# Patient Record
Sex: FEMALE | Race: BLACK OR AFRICAN AMERICAN | NOT HISPANIC OR LATINO | Employment: UNEMPLOYED | URBAN - METROPOLITAN AREA
[De-identification: names, ages, dates, MRNs, and addresses within clinical notes are randomized per-mention and may not be internally consistent; named-entity substitution may affect disease eponyms.]

---

## 2017-09-14 ENCOUNTER — OFFICE VISIT (OUTPATIENT)
Dept: FAMILY MEDICINE | Facility: CLINIC | Age: 42
End: 2017-09-14

## 2017-09-14 VITALS
BODY MASS INDEX: 25.32 KG/M2 | RESPIRATION RATE: 20 BRPM | HEIGHT: 62 IN | TEMPERATURE: 97.9 F | DIASTOLIC BLOOD PRESSURE: 78 MMHG | HEART RATE: 64 BPM | OXYGEN SATURATION: 99 % | WEIGHT: 137.6 LBS | SYSTOLIC BLOOD PRESSURE: 119 MMHG

## 2017-09-14 DIAGNOSIS — Z30.42 DEPO-PROVERA CONTRACEPTIVE STATUS: Primary | ICD-10-CM

## 2017-09-14 DIAGNOSIS — Z23 NEEDS FLU SHOT: ICD-10-CM

## 2017-09-14 LAB — HCG UR QL: NEGATIVE

## 2017-09-14 RX ORDER — MEDROXYPROGESTERONE ACETATE 150 MG/ML
150 INJECTION, SUSPENSION INTRAMUSCULAR
Qty: 1 ML | Status: CANCELLED | OUTPATIENT
Start: 2017-09-14 | End: 2017-09-15

## 2017-09-14 NOTE — LETTER
September 14, 2017      Palmira Veloz  [facility] DELIVERY  [place] [address]        Dear Palmira,    Your pregnancy test was negative as expected.  When the repeat is negative in 2 weeks, we will give you your depo shot that will be repeated every 3 months.  Thanks for coming to clinic.    WR    Please see below for your test results.    Resulted Orders   HCG Qualitative Urine (UPT)  ([facility])   Result Value Ref Range    HCG Qual Urine NEGATIVE Negative       If you have any questions, please call the clinic to make an appointment.    Sincerely,    Matthew Harper MD

## 2017-09-14 NOTE — PROGRESS NOTES
Palmira,  Your pregnancy test was negative as expected.  When the repeat is negative in 2 weeks, we will give you your depo shot that will be repeated every 3 months.  Thanks for coming to clinic.  WR

## 2017-09-14 NOTE — NURSING NOTE
Palmira Veloz      1.  Has the patient received the information for the influenza vaccine? YES    2.  Does the patient have any of the following contraindications?     Allergy to eggs? No     Allergic reaction to previous influenza vaccines? No     Any other problems to previous influenza vaccines? No     Paralyzed by Guillain-[place] syndrome? No     Currently pregnant? NO     Current moderate or severe illness? No    Vaccination given by Juan Mckeon, CMA  Recorded by Juan Mckeon

## 2017-09-14 NOTE — MR AVS SNAPSHOT
After Visit Summary   9/14/2017    Palmira Veloz    MRN: [id number]           Patient Information     Date Of Birth          1975        Visit Information        Provider Department      9/14/2017 11:00 AM Matthew Harper MD [facility]        Today's Diagnoses     Depo-Provera contraceptive status    -  1    Needs flu shot          Care Instructions    Come back in 2 weeks for another UPT and if that comes back negative we will administer the Depo for you today        Your medication list is printed, please keep this with you, it is helpful to bring this current list to any other medical appointments, the emergency room or hospital.    If you had lab testing today and your results are reassuring or normal they will be be mailed to you within 7 days.     If the lab tests need quick action we will call you with the results.   The phone number we will call with results is # [phone] (home) . If this is not the best number please call our clinic and change the number.    If you need any refills please call your pharmacy and they will contact us.    If you have any further concerns or wish to schedule another appointment you must call our office during normal business hours  [phone] (8-5:00 M-F)  If you have urgent medical questions that cannot wait  you may also call [phone] at any time of day.  If you have a medical emergency please call [phone].    Thank you for coming to [facility].            Follow-ups after your visit        Your next 10 appointments already scheduled     Sep 21, 2017  9:20 AM CDT   Return Visit with Matthew Harper MD   [facility] ([facility])    [address] [place] [id number]   [phone]              Future tests that were ordered for you today     Open Future Orders        Priority Expected Expires Ordered    HCG Qualitative Urine (UPT)  ([facility]) Routine  9/14/2018 9/14/2017            Who to contact      "Please call your clinic at [phone] to:    Ask questions about your health    Make or cancel appointments    Discuss your medicines    Learn about your test results    Speak to your doctor   If you have compliments or concerns about an experience at your clinic, or if you wish to file a complaint, please contact [facility] Patient Relations at [phone] or email us at [email or web address]         Additional Information About Your Visit        Care EveryWhere ID     This is your Care EveryWhere ID. This could be used by other organizations to access your [place] medical records  [id number]        Your Vitals Were     Pulse Temperature Respirations Height Last Period Pulse Oximetry    64 97.9  F (36.6  C) (Oral) 20 5' 1.5\" (156.2 cm) 08/28/2017 99%    BMI (Body Mass Index)                   25.58 kg/m2            Blood Pressure from Last 3 Encounters:   09/14/17 119/78   10/19/16 103/71   09/06/16 126/81    Weight from Last 3 Encounters:   09/14/17 137 lb 9.6 oz (62.4 kg)   10/19/16 144 lb (65.3 kg)   09/06/16 148 lb (67.1 kg)              We Performed the Following     ADMIN VACCINE, INITIAL     Flu vaccine, quad, preserve-free, 0.5 ml     HCG Qualitative Urine (UPT)  ([facility])        Primary Care Provider Office Phone # Fax #    Nichole Sammi Perez [phone]       [facility] [address] [place] [address]        Equal Access to Services     [facility] CampusSARI AH: Hadii aad ku hadasho Soomaali, waaxda luqadaha, qaybta kaalmada adeegyada, waxay carlos najera . So [facility] [phone].    ATENCIÓN: Si habla español, tiene a zamudio disposición servicios gratuitos de asistencia lingüística. Llame al [phone].    We comply with applicable federal civil rights laws and [place] laws. We do not discriminate on the basis of race, color, national origin, age, disability sex, sexual orientation or gender identity.            Thank " you!     Thank you for choosing [facility]  for your care. Our goal is always to provide you with excellent care. Hearing back from our patients is one way we can continue to improve our services. Please take a few minutes to complete the written survey that you may receive in the mail after your visit with us. Thank you!             Your Updated Medication List - Protect others around you: Learn how to safely use, store and throw away your medicines at www.disposemymeds.org.          This list is accurate as of: 9/14/17 11:59 PM.  Always use your most recent med list.                   Brand Name Dispense Instructions for use Diagnosis    * escitalopram 20 MG tablet    LEXAPRO    90 tablet    Take 1 tablet (20 mg) by mouth daily    Other depression, Encounter for surveillance of injectable contraceptive       * escitalopram 20 MG tablet    LEXAPRO    30 tablet    Take 1 tablet (20 mg) by mouth daily    Depression with anxiety, Insomnia, unspecified type       medroxyPROGESTERone 150 MG/ML injection    DEPO-PROVERA    1 mL    Inject 1 mL (150 mg) into the muscle every 3 months for 20 days    Encounter for surveillance of injectable contraceptive       order for DME     1 Device    Equipment being ordered: silicone sleeve for right 5th toe (bone spur)    Pain of toe of right foot       TOPAMAX PO      Take 10 mg by mouth daily    Encounter for surveillance of injectable contraceptive, Other depression       zolpidem 5 MG tablet    AMBIEN    14 tablet    Take 1 tablet (5 mg) by mouth nightly as needed for sleep May repeat x 1 if needed.    Insomnia, unspecified type       * Notice:  This list has 2 medication(s) that are the same as other medications prescribed for you. Read the directions carefully, and ask your doctor or other care provider to review them with you.

## 2017-09-14 NOTE — PATIENT INSTRUCTIONS
Come back in 2 weeks for another UPT and if that comes back negative we will administer the Depo for you today        Your medication list is printed, please keep this with you, it is helpful to bring this current list to any other medical appointments, the emergency room or hospital.    If you had lab testing today and your results are reassuring or normal they will be be mailed to you within 7 days.     If the lab tests need quick action we will call you with the results.   The phone number we will call with results is # [phone] (home) . If this is not the best number please call our clinic and change the number.    If you need any refills please call your pharmacy and they will contact us.    If you have any further concerns or wish to schedule another appointment you must call our office during normal business hours  [phone] (8-5:00 M-F)  If you have urgent medical questions that cannot wait  you may also call [phone] at any time of day.  If you have a medical emergency please call [phone].    Thank you for coming to [facility].

## 2017-09-14 NOTE — PROGRESS NOTES
"Chief Complaint   Patient presents with     Contraception     Depo     /78 (BP Location: Right arm, Patient Position: Chair, Cuff Size: Adult Regular)  Pulse 64  Temp 97.9  F (36.6  C) (Oral)  Resp 20  Ht 5' 1.5\" (156.2 cm)  Wt 137 lb 9.6 oz (62.4 kg)  LMP 08/28/2017  SpO2 99%  BMI 25.58 kg/m2    SUBJECTIVE:  This is a 42-year-old woman in to start Depo.  She was on it until about a year ago she went off.  Her periods have been regular since she would like to go back on.  She does not want pregnancy.  She is not currently sexually active and does not have any immediate prospects but does not want to get pregnant in the future.  She has 2 children, ages 17 and 24.  She does not have any grandchildren.   OBJECTIVE:   APPEARANCE:  No acute illness.   ASSESSMENT:     1.  Contraception with Depo-Provera.   PLAN:  We discussed the protocol of tapering negative pregnancy test 2 weeks apart.  We will schedule her for lab visit in 2 weeks if the pregnancy test is negative she can get the Depo-Provera without see me.   She should then get the Depo-Provera every 3 months.  She understands the medication from last time and side effects and benefits of the medication.  She understands that maybe some spotting for a few months.   The patient involved in medical decision making throughout the visit.   Recheck in 2 weeks for pregnancy test and Depo Provera If pregnancy test is negative.       "

## 2017-09-28 ENCOUNTER — ALLIED HEALTH/NURSE VISIT (OUTPATIENT)
Dept: FAMILY MEDICINE | Facility: CLINIC | Age: 42
End: 2017-09-28

## 2017-09-28 VITALS
TEMPERATURE: 98.1 F | BODY MASS INDEX: 26.29 KG/M2 | WEIGHT: 139.25 LBS | HEART RATE: 81 BPM | HEIGHT: 61 IN | DIASTOLIC BLOOD PRESSURE: 77 MMHG | SYSTOLIC BLOOD PRESSURE: 113 MMHG | OXYGEN SATURATION: 100 %

## 2017-09-28 DIAGNOSIS — Z30.42 ENCOUNTER FOR SURVEILLANCE OF INJECTABLE CONTRACEPTIVE: ICD-10-CM

## 2017-09-28 DIAGNOSIS — Z30.42 DEPO-PROVERA CONTRACEPTIVE STATUS: ICD-10-CM

## 2017-09-28 LAB — HCG UR QL: NEGATIVE

## 2017-09-28 NOTE — NURSING NOTE
The following medication was given:   Depo give to patient with reminder card give to patient.  Next Depo due between 12/15/17 to 01/04/2018      MEDICATION: Medroxyprogesterone (DEPO)  ROUTE: {ROUTE IM  SITE: LT gluteus   DOSE: 150 mg/1 ml  LOT #:[id number]  :  Cem  EXPIRATION DATE:  01/01/2020  NDC#: [address]-0  Depo give by Michelle Escalona MA  Second UPT was negative today and per DR Harper notes  in 09/14/17 if  second UPT negative okay to give Depo to patient.  Medication was verify with Malu Kapoor CMA, Dr. was original ordered depo shot. FRANCOIS Paulino Dr is a preceptor on site during the time of service.

## 2017-09-28 NOTE — LETTER
September 29, 2017      Palmira Veloz  [facility] DELIVERY  [place] [address]        Dear Palmira,    As you know your test was negative and you got your shot.  Please return for the next shot in 3 months.    WR    Please see below for your test results.    Resulted Orders   HCG Qualitative Urine (UPT)  (P )   Result Value Ref Range    HCG Qual Urine NEGATIVE Negative       If you have any questions, please call the clinic to make an appointment.    Sincerely,    Matthew Harper MD

## 2017-09-28 NOTE — MR AVS SNAPSHOT
"              After Visit Summary   9/28/2017    Palmira Veloz    MRN: [id number]           Patient Information     Date Of Birth          1975        Visit Information        Provider Department      9/28/2017 10:00 AM Nurse, Ann Ump [facility]        Today's Diagnoses     Depo-Provera contraceptive status           Follow-ups after your visit        Who to contact     Please call your clinic at [phone] to:    Ask questions about your health    Make or cancel appointments    Discuss your medicines    Learn about your test results    Speak to your doctor   If you have compliments or concerns about an experience at your clinic, or if you wish to file a complaint, please contact [facility] Patient Relations at [phone] or email us at [email or web address]         Additional Information About Your Visit        Care EveryWhere ID     This is your Care EveryWhere ID. This could be used by other organizations to access your [place] medical records  [id number]        Your Vitals Were     Pulse Temperature Height Last Period Pulse Oximetry BMI (Body Mass Index)    81 98.1  F (36.7  C) (Oral) 5' 1\" (154.9 cm) 09/27/2017 100% 26.31 kg/m2       Blood Pressure from Last 3 Encounters:   09/28/17 113/77   09/14/17 119/78   10/19/16 103/71    Weight from Last 3 Encounters:   09/28/17 139 lb 4 oz (63.2 kg)   09/14/17 137 lb 9.6 oz (62.4 kg)   10/19/16 144 lb (65.3 kg)              We Performed the Following     HCG Qualitative Urine (UPT)  ([facility])     INJECTION INTRAMUSCULAR OR SUB-Q     medroxyPROGESTERone (DEPO-PROVERA) injection 150 mg (Charge)        Primary Care Provider Office Phone # Fax #    Nichole Perez [phone]       [facility] [address] [place] [address]        Equal Access to Services     HARJIT CERDA AH: Hadii aad ku hadasho Soomaali, waaxda luqadaha, qaybta kaalmada adeegyada, juli polo " mahendramanueltaurus greeneAshleyparish ah. So [facility] [phone].    ATENCIÓN: Si bharti davies, tiene a zamudio disposición servicios gratuitos de asistencia lingüística. Es oneal [phone].    We comply with applicable federal civil rights laws and [place] laws. We do not discriminate on the basis of race, color, national origin, age, disability sex, sexual orientation or gender identity.            Thank you!     Thank you for choosing [facility]  for your care. Our goal is always to provide you with excellent care. Hearing back from our patients is one way we can continue to improve our services. Please take a few minutes to complete the written survey that you may receive in the mail after your visit with us. Thank you!             Your Updated Medication List - Protect others around you: Learn how to safely use, store and throw away your medicines at www.disposemymeds.org.          This list is accurate as of: 9/28/17 10:55 AM.  Always use your most recent med list.                   Brand Name Dispense Instructions for use Diagnosis    * escitalopram 20 MG tablet    LEXAPRO    90 tablet    Take 1 tablet (20 mg) by mouth daily    Other depression, Encounter for surveillance of injectable contraceptive       * escitalopram 20 MG tablet    LEXAPRO    30 tablet    Take 1 tablet (20 mg) by mouth daily    Depression with anxiety, Insomnia, unspecified type       medroxyPROGESTERone 150 MG/ML injection    DEPO-PROVERA    1 mL    Inject 1 mL (150 mg) into the muscle every 3 months for 20 days    Encounter for surveillance of injectable contraceptive       order for DME     1 Device    Equipment being ordered: silicone sleeve for right 5th toe (bone spur)    Pain of toe of right foot       TOPAMAX PO      Take 10 mg by mouth daily    Encounter for surveillance of injectable contraceptive, Other depression       zolpidem 5 MG tablet    AMBIEN    14 tablet    Take 1 tablet (5 mg) by mouth nightly as needed for sleep May repeat x 1 if  needed.    Insomnia, unspecified type       * Notice:  This list has 2 medication(s) that are the same as other medications prescribed for you. Read the directions carefully, and ask your doctor or other care provider to review them with you.

## 2017-09-29 NOTE — PROGRESS NOTES
Palmira,  As you know your test was negative and you got your shot.  Please return for the next shot in 3 months.  WR

## 2017-10-01 RX ORDER — MEDROXYPROGESTERONE ACETATE 150 MG/ML
150 INJECTION, SUSPENSION INTRAMUSCULAR
Qty: 1 ML | Refills: 0 | OUTPATIENT
Start: 2017-12-15 | End: 2017-12-22

## 2017-12-22 ENCOUNTER — OFFICE VISIT (OUTPATIENT)
Dept: FAMILY MEDICINE | Facility: CLINIC | Age: 42
End: 2017-12-22
Payer: COMMERCIAL

## 2017-12-22 VITALS
HEIGHT: 62 IN | SYSTOLIC BLOOD PRESSURE: 121 MMHG | HEART RATE: 76 BPM | DIASTOLIC BLOOD PRESSURE: 81 MMHG | TEMPERATURE: 97.9 F | BODY MASS INDEX: 26.87 KG/M2 | OXYGEN SATURATION: 100 % | WEIGHT: 146 LBS

## 2017-12-22 DIAGNOSIS — Z30.42 ENCOUNTER FOR SURVEILLANCE OF INJECTABLE CONTRACEPTIVE: ICD-10-CM

## 2017-12-22 DIAGNOSIS — Z30.42 SURVEILLANCE OF CONTRACEPTIVE INJECTION: ICD-10-CM

## 2017-12-22 DIAGNOSIS — J06.9 UPPER RESPIRATORY TRACT INFECTION, UNSPECIFIED TYPE: Primary | ICD-10-CM

## 2017-12-22 RX ORDER — ACETAMINOPHEN 325 MG/1
650 TABLET ORAL EVERY 4 HOURS PRN
Qty: 100 TABLET | Refills: 0 | Status: SHIPPED | OUTPATIENT
Start: 2017-12-22 | End: 2018-12-13

## 2017-12-22 RX ORDER — BENZONATATE 100 MG/1
100 CAPSULE ORAL 3 TIMES DAILY PRN
Qty: 42 CAPSULE | Refills: 0 | Status: SHIPPED | OUTPATIENT
Start: 2017-12-22 | End: 2018-12-13

## 2017-12-22 NOTE — NURSING NOTE
BP: 121/81    LAST PAP/EXAM: No results found for: PAP  URINE HCG:not indicated    The following medication was administered to Palmira Veloz by Jo Rutledge CMA:     MEDICATION: Depo Provera 150mg  ROUTE: IM  SITE: LUQ - Gluteus  : [employer]  LOT #: [id number]  EXP:04/2019  NEXT INJECTION DUE: 3/9/18 - 3/30/18   Provider: Santana onside and ordering provider is Dr Perez for this medication

## 2017-12-22 NOTE — PROGRESS NOTES
HPI:       Palmira Veloz is a 42 year old  female with a significant past medical history of GERD, conversion disorder, acute reaction to stress and depression who presents for the new concern(s) of    Persistent Flu-Like Symptoms  Approximately 1 week ago, she developed cough with clear sputum production, rhinorrhea, headaches and diffuse myalgia.  Nothing seems to trigger her symptoms but she did noted recent sick contacts with multiple members of her family with similar symptoms.  She also endorsed episodes of diarrhea, nausea and vomiting which has improved since onset.  Her family members have solely done better she feels that her symptoms have been unchanged since it started.  Has been taking [employer] pain reliever but does not know the details of medication.  Denies any relief with medication.  Denies any lightheadedness, focal weakness, abdominal pain, numbness, difficulty with breathing, fevers or chills.  Did noted some midsternal chest pain when coughing but none at baseline. Coughing does get worse at night but has not affect her sleep.    Depo-Shot  Would like repeat depo-shot, last shot was about 3 months ago. No concerns for pregnancy. Negative UPT at last visit.         PMHX:     Patient Active Problem List   Diagnosis     Health Care Home     Conversion disorder     Contact dermatitis and other eczema, due to unspecified cause     Esophageal reflux     Insomnia     Other and unspecified derangement of medial meniscus     Pain in joint, shoulder region     Major depressive disorder, recurrent episode (H)     Tobacco use disorder     Knee cap dislocation     Acute reaction to stress     Depo-Provera contraceptive status     Current Outpatient Prescriptions   Medication Sig Dispense Refill     benzonatate (TESSALON) 100 MG capsule Take 1 capsule (100 mg) by mouth 3 times daily as needed for cough 42 capsule 0     acetaminophen (TYLENOL) 325 MG tablet Take 2 tablets (650 mg) by mouth every 4  hours as needed for mild pain 100 tablet 0     medroxyPROGESTERone (DEPO-PROVERA) 150 MG/ML injection Inject 1 mL (150 mg) into the muscle every 3 months for 21 days 1 mL 0     zolpidem (AMBIEN) 5 MG tablet Take 1 tablet (5 mg) by mouth nightly as needed for sleep May repeat x 1 if needed. 14 tablet 0     escitalopram (LEXAPRO) 20 MG tablet Take 1 tablet (20 mg) by mouth daily 30 tablet 1     order for DME Equipment being ordered: silicone sleeve for right 5th toe (bone spur) 1 Device 0     Topiramate (TOPAMAX PO) Take 10 mg by mouth daily       escitalopram (LEXAPRO) 20 MG tablet Take 1 tablet (20 mg) by mouth daily 90 tablet 3     Social History     Social History     Marital status: Single     Spouse name: N/A     Number of children: N/A     Years of education: N/A     Occupational History     Not on file.     Social History Main Topics     Smoking status: Current Every Day Smoker     Types: Cigarettes     Smokeless tobacco: Never Used      Comment: Pt. is trying to quit smoking.  Day #2     Alcohol use 0.0 oz/week     0 Standard drinks or equivalent per week      Comment: Occasionally.      Drug use: Yes      Comment: Marijuana Daily      Sexual activity: Not on file     Other Topics Concern     Not on file     Social History Narrative          Allergies   Allergen Reactions     Nkda [No Known Drug Allergies]        No results found for this or any previous visit (from the past 24 hour(s)).         Review of Systems:   C: NEGATIVE for fatigue, unexpected change in weight  E: NEGATIVE for acute vision problems or changes  ENT/MOUTH: Rhinorrhea, See HPI  RESP: coughing: see HPI  CV: NEGATIVE for palpitations or new or worsening peripheral edema  CV: chest pain: see HPI  GI: nausea, vomiting, diarrhea which has improved/resolved  : NEGATIVE for frequency, dysuria, or hematuria  MUSCULOSKELETAL:diffuse body aches, no joint pain: see HPI  NEURO: slight headaches: see HPI  P: NEGATIVE for changes in mood or affect  "         Physical Exam:     Vitals:    12/22/17 1135   BP: 121/81   Pulse: 76   Temp: 97.9  F (36.6  C)   SpO2: 100%   Weight: 146 lb (66.2 kg)   Height: 5' 1.5\" (156.2 cm)     Body mass index is 27.14 kg/(m^2).    GENERAL APPEARANCE: healthy, alert and no distress,  EYES: Eyes grossly normal to inspection,  PERRL  HENT: ear canals and TM's normal and nose and mouth without ulcers or lesions, thick mucus in posterior pharynx  RESP: lungs clear to auscultation - no rales, rhonchi or wheezes  CV: regular rate and rhythm,  and no murmur, click,  rub or gallop  ABDOMEN: soft, nontender, without hepatosplenomegaly or masses  MS: extremities normal- no gross deformities noted  PSYCH: mood and affect normal/bright    Assessment and Plan     1. Upper respiratory tract infection, unspecified type  Coughing, rhinorrhea and recent sick contact.  Unlikely pneumonia given clear lung exam.  Discuss natural course of illness with patient and expectation for improving symptoms over the next week or so.  Coughing seems significant and patient is requesting medication.  Will trial Tessalon Perles and conservative therapy.  -Conservative therapy: Staying well-hydrated, Tylenol as needed for discomfort  -Discontinue over-the-counter pain medication given unclear details  - Discussed warning signs for immediate reevaluation including persistent fevers,-difficulty with breathing or episodes of presyncope/syncope.  - benzonatate (TESSALON) 100 MG capsule; Take 1 capsule (100 mg) by mouth 3 times daily as needed for cough  Dispense: 42 capsule; Refill: 0  - acetaminophen (TYLENOL) 325 MG tablet; Take 2 tablets (650 mg) by mouth every 4 hours as needed for mild pain  Dispense: 100 tablet; Refill: 0    2. Surveillance of contraceptive injection  - INJECTION INTRAMUSCULAR OR SUB-Q  - medroxyPROGESTERone (DEPO-PROVERA) injection 150 mg (Charge)    Options for treatment and follow-up care were reviewed with the patient and/or guardian. Palmira" MARLIN Veloz and/or guardian engaged in the decision making process and verbalized understanding of the options discussed and agreed with the final plan.    Stephon Doll MD  [facility]  Pager: [phone]    Precepted today with: Jennifer Ramirez MD

## 2017-12-22 NOTE — LETTER
RETURN TO WORK/SCHOOL FORM    12/22/2017    Re: Palmira Veloz  1975      To Whom It May Concern:    Palmira Veloz was seen in clinic today. Please excused her from school for the days she has miss. If you have any concerns, please feel free to give us a call.      Stephon Doll MD  12/22/2017 11:59 AM

## 2017-12-22 NOTE — MR AVS SNAPSHOT
"              After Visit Summary   2017    Palmira Veloz    MRN: [id number]           Patient Information     Date Of Birth          1975        Visit Information        Provider Department      2017 11:20 AM Stephon Doll MD [facility]        Today's Diagnoses     Upper respiratory tract infection, unspecified type    -  1       Follow-ups after your visit        Who to contact     Please call your clinic at [phone] to:    Ask questions about your health    Make or cancel appointments    Discuss your medicines    Learn about your test results    Speak to your doctor   If you have compliments or concerns about an experience at your clinic, or if you wish to file a complaint, please contact [facility] Patient Relations at [phone] or email us at [email or web address]         Additional Information About Your Visit        MyChart Information     [employer] is an electronic gateway that provides easy, online access to your medical records. With FanHero, you can request a clinic appointment, read your test results, renew a prescription or communicate with your care team.     To sign up for [employer] visit the website at [email or web address]   You will be asked to enter the access code listed below, as well as some personal information. Please follow the directions to create your username and password.     Your access code is: [email or web address]  Expires: 3/22/2018 12:00 PM     Your access code will  in 90 days. If you need help or a new code, please contact your [facility] or call [phone] for assistance.        Care EveryWhere ID     This is your Care EveryWhere ID. This could be used by other organizations to access your [place] medical records  [id number]        Your Vitals Were     Pulse Temperature Height Pulse Oximetry BMI (Body Mass Index)       76 97.9  F (36.6  C) 5' 1.5\" (156.2 cm) 100% " 27.14 kg/m2        Blood Pressure from Last 3 Encounters:   12/22/17 121/81   09/28/17 113/77   09/14/17 119/78    Weight from Last 3 Encounters:   12/22/17 146 lb (66.2 kg)   09/28/17 139 lb 4 oz (63.2 kg)   09/14/17 137 lb 9.6 oz (62.4 kg)              Today, you had the following     No orders found for display         Today's Medication Changes          These changes are accurate as of: 12/22/17 12:02 PM.  If you have any questions, ask your nurse or doctor.               Start taking these medicines.        Dose/Directions    acetaminophen 325 MG tablet   Commonly known as:  TYLENOL   Used for:  Upper respiratory tract infection, unspecified type   Started by:  Stephon Doll MD        Dose:  650 mg   Take 2 tablets (650 mg) by mouth every 4 hours as needed for mild pain   Quantity:  100 tablet   Refills:  0       benzonatate 100 MG capsule   Commonly known as:  TESSALON   Used for:  Upper respiratory tract infection, unspecified type   Started by:  Stephon Doll MD        Dose:  100 mg   Take 1 capsule (100 mg) by mouth 3 times daily as needed for cough   Quantity:  42 capsule   Refills:  0            Where to get your medicines      These medications were sent to [id number]     Phone:  [phone]     acetaminophen 325 MG tablet    benzonatate 100 MG capsule                Primary Care Provider Office Phone # Fax #    Nichole Sammi Perez [phone]       [facility] [address] [place] [address]        Equal Access to Services     [facility] AH: Hadii aad ku hadasho Soomaali, waaxda luqadaha, qaybta kaalmada adeegyada, juli cadet. So [facility] [phone].    ATENCIÓN: Si habla español, tiene a zamudio disposición servicios gratuitos de asistencia lingüística. Llame al [phone].    We comply with applicable federal civil rights laws and [place] laws.  We do not discriminate on the basis of race, color, national origin, age, disability, sex, sexual orientation, or gender identity.            Thank you!     Thank you for choosing [facility]  for your care. Our goal is always to provide you with excellent care. Hearing back from our patients is one way we can continue to improve our services. Please take a few minutes to complete the written survey that you may receive in the mail after your visit with us. Thank you!             Your Updated Medication List - Protect others around you: Learn how to safely use, store and throw away your medicines at www.disposemymeds.org.          This list is accurate as of: 12/22/17 12:02 PM.  Always use your most recent med list.                   Brand Name Dispense Instructions for use Diagnosis    acetaminophen 325 MG tablet    TYLENOL    100 tablet    Take 2 tablets (650 mg) by mouth every 4 hours as needed for mild pain    Upper respiratory tract infection, unspecified type       benzonatate 100 MG capsule    TESSALON    42 capsule    Take 1 capsule (100 mg) by mouth 3 times daily as needed for cough    Upper respiratory tract infection, unspecified type       * escitalopram 20 MG tablet    LEXAPRO    90 tablet    Take 1 tablet (20 mg) by mouth daily    Other depression, Encounter for surveillance of injectable contraceptive       * escitalopram 20 MG tablet    LEXAPRO    30 tablet    Take 1 tablet (20 mg) by mouth daily    Depression with anxiety, Insomnia, unspecified type       medroxyPROGESTERone 150 MG/ML injection    DEPO-PROVERA    1 mL    Inject 1 mL (150 mg) into the muscle every 3 months for 21 days    Encounter for surveillance of injectable contraceptive       order for DME     1 Device    Equipment being ordered: silicone sleeve for right 5th toe (bone spur)    Pain of toe of right foot       TOPAMAX PO      Take 10 mg by mouth daily    Encounter for surveillance of injectable contraceptive, Other  depression       zolpidem 5 MG tablet    AMBIEN    14 tablet    Take 1 tablet (5 mg) by mouth nightly as needed for sleep May repeat x 1 if needed.    Insomnia, unspecified type       * Notice:  This list has 2 medication(s) that are the same as other medications prescribed for you. Read the directions carefully, and ask your doctor or other care provider to review them with you.

## 2017-12-23 RX ORDER — MEDROXYPROGESTERONE ACETATE 150 MG/ML
150 INJECTION, SUSPENSION INTRAMUSCULAR
Qty: 1 ML | Refills: 0 | OUTPATIENT
Start: 2018-03-09 | End: 2018-03-13

## 2017-12-26 ASSESSMENT — PATIENT HEALTH QUESTIONNAIRE - PHQ9: SUM OF ALL RESPONSES TO PHQ QUESTIONS 1-9: 9

## 2017-12-29 NOTE — PROGRESS NOTES
Preceptor Attestation:  Patient's case reviewed and discussed with Stephon Doll MD Patient seen and discussed with the resident.. I agree with assessment and plan of care.  Supervising Physician:  Jennifer Ramirez MD  [facility]

## 2018-03-13 ENCOUNTER — ALLIED HEALTH/NURSE VISIT (OUTPATIENT)
Dept: FAMILY MEDICINE | Facility: CLINIC | Age: 43
End: 2018-03-13
Payer: COMMERCIAL

## 2018-03-13 VITALS
TEMPERATURE: 98.2 F | HEART RATE: 66 BPM | OXYGEN SATURATION: 99 % | RESPIRATION RATE: 20 BRPM | BODY MASS INDEX: 27.64 KG/M2 | SYSTOLIC BLOOD PRESSURE: 114 MMHG | WEIGHT: 146.4 LBS | DIASTOLIC BLOOD PRESSURE: 79 MMHG | HEIGHT: 61 IN

## 2018-03-13 DIAGNOSIS — Z30.42 ENCOUNTER FOR SURVEILLANCE OF INJECTABLE CONTRACEPTIVE: ICD-10-CM

## 2018-03-13 DIAGNOSIS — Z30.42 DEPO-PROVERA CONTRACEPTIVE STATUS: Primary | ICD-10-CM

## 2018-03-13 RX ORDER — MEDROXYPROGESTERONE ACETATE 150 MG/ML
150 INJECTION, SUSPENSION INTRAMUSCULAR
Qty: 1 ML | Refills: 0 | OUTPATIENT
Start: 2018-05-29 | End: 2018-05-30

## 2018-03-13 NOTE — MR AVS SNAPSHOT
"              After Visit Summary   3/13/2018    Palmira Veloz    MRN: [id number]           Patient Information     Date Of Birth          1975        Visit Information        Provider Department      3/13/2018 10:00 AM Nurse, Ann Ump [facility]        Today's Diagnoses     Depo-Provera contraceptive status    -  1       Follow-ups after your visit        Your next 10 appointments already scheduled     Mar 16, 2018  1:40 PM CDT   Return Visit with Lauren Hayward DO   [facility] ([facility])    [address] [place] [address]   [phone]              Who to contact     Please call your clinic at [phone] to:    Ask questions about your health    Make or cancel appointments    Discuss your medicines    Learn about your test results    Speak to your doctor            Additional Information About Your Visit        MyChart Information     [employer] is an electronic gateway that provides easy, online access to your medical records. With [employer], you can request a clinic appointment, read your test results, renew a prescription or communicate with your care team.     To sign up for Vo[employer] visit the website at [email or web address]   You will be asked to enter the access code listed below, as well as some personal information. Please follow the directions to create your username and password.     Your access code is: [email or web address]  Expires: 3/22/2018  1:00 PM     Your access code will  in 90 days. If you need help or a new code, please contact your [facility] or call [phone] for assistance.        Care EveryWhere ID     This is your Care EveryWhere ID. This could be used by other organizations to access your [place] medical records  [id number]        Your Vitals Were     Pulse Temperature Respirations Height Pulse Oximetry BMI (Body Mass Index)    66 98.2  F (36.8  C) (Oral) 20 5' 1.25\" (155.6 cm) 99% 27.44 kg/m2       Blood " Pressure from Last 3 Encounters:   03/13/18 114/79   12/22/17 121/81   09/28/17 113/77    Weight from Last 3 Encounters:   03/13/18 146 lb 6.4 oz (66.4 kg)   12/22/17 146 lb (66.2 kg)   09/28/17 139 lb 4 oz (63.2 kg)              We Performed the Following     INJECTION INTRAMUSCULAR OR SUB-Q     medroxyPROGESTERone (DEPO-PROVERA) injection 150 mg (Charge)        Primary Care Provider Office Phone # Fax #    Nichole Sammi Perez [phone]       [facility] [address] [place] [address]        Equal Access to Services     HARJIT CERDA : Linda Looney, wasilvana burton, qaybta kaalmada myra, juli cadet. So [facility] [phone].    ATENCIÓN: Si habla español, tiene a zamudio disposición servicios gratuitos de asistencia lingüística. Llame al [phone].    We comply with applicable federal civil rights laws and [place] laws. We do not discriminate on the basis of race, color, national origin, age, disability, sex, sexual orientation, or gender identity.            Thank you!     Thank you for choosing [facility]  for your care. Our goal is always to provide you with excellent care. Hearing back from our patients is one way we can continue to improve our services. Please take a few minutes to complete the written survey that you may receive in the mail after your visit with us. Thank you!             Your Updated Medication List - Protect others around you: Learn how to safely use, store and throw away your medicines at www.disposemymeds.org.          This list is accurate as of 3/13/18 10:29 AM.  Always use your most recent med list.                   Brand Name Dispense Instructions for use Diagnosis    acetaminophen 325 MG tablet    TYLENOL    100 tablet    Take 2 tablets (650 mg) by mouth every 4 hours as needed for mild pain    Upper respiratory tract infection, unspecified type       benzonatate 100 MG capsule    TESSALON     42 capsule    Take 1 capsule (100 mg) by mouth 3 times daily as needed for cough    Upper respiratory tract infection, unspecified type       * escitalopram 20 MG tablet    LEXAPRO    90 tablet    Take 1 tablet (20 mg) by mouth daily    Other depression, Encounter for surveillance of injectable contraceptive       * escitalopram 20 MG tablet    LEXAPRO    30 tablet    Take 1 tablet (20 mg) by mouth daily    Depression with anxiety, Insomnia, unspecified type       medroxyPROGESTERone 150 MG/ML injection    DEPO-PROVERA    1 mL    Inject 1 mL (150 mg) into the muscle every 3 months    Encounter for surveillance of injectable contraceptive       order for DME     1 Device    Equipment being ordered: silicone sleeve for right 5th toe (bone spur)    Pain of toe of right foot       TOPAMAX PO      Take 10 mg by mouth daily    Encounter for surveillance of injectable contraceptive, Other depression       zolpidem 5 MG tablet    AMBIEN    14 tablet    Take 1 tablet (5 mg) by mouth nightly as needed for sleep May repeat x 1 if needed.    Insomnia, unspecified type       * Notice:  This list has 2 medication(s) that are the same as other medications prescribed for you. Read the directions carefully, and ask your doctor or other care provider to review them with you.

## 2018-03-13 NOTE — NURSING NOTE
BP: 114/79    LAST PAP/EXAM: No results found for: PAP  URINE HCG:not indicated    The following medication was given:     MEDICATION: Depo Provera 150mg  ROUTE: IM  SITE: Deltoid - Right  : TEVA  LOT #: [id number]  EXP:07/2019  NEXT INJECTION DUE: 5/29/18 - 6/19/18   Provider on site during injection: Dr. Anderson  Ordering Provider: Jayda Anderson  Person who administered medication: Juan Mckeon CMA

## 2018-03-16 ENCOUNTER — TELEPHONE (OUTPATIENT)
Dept: FAMILY MEDICINE | Facility: CLINIC | Age: 43
End: 2018-03-16

## 2018-03-16 ENCOUNTER — OFFICE VISIT (OUTPATIENT)
Dept: FAMILY MEDICINE | Facility: CLINIC | Age: 43
End: 2018-03-16
Payer: COMMERCIAL

## 2018-03-16 VITALS
HEART RATE: 80 BPM | TEMPERATURE: 98.5 F | OXYGEN SATURATION: 100 % | WEIGHT: 147.8 LBS | DIASTOLIC BLOOD PRESSURE: 87 MMHG | HEIGHT: 61 IN | SYSTOLIC BLOOD PRESSURE: 126 MMHG | BODY MASS INDEX: 27.9 KG/M2

## 2018-03-16 DIAGNOSIS — H54.7 DIMINISHED VISION: ICD-10-CM

## 2018-03-16 DIAGNOSIS — S05.92XA LEFT EYE INJURY, INITIAL ENCOUNTER: Primary | ICD-10-CM

## 2018-03-16 NOTE — TELEPHONE ENCOUNTER
had talked to me about some resources for the pt.  The pt and her eighteen years old son is homeless, and there are not any shelter that would let them both stay together. I know that family shelters within the [facility] would considered the pt son as an adult, so they would have to go to different shelters.  I called [facility] [address], they were able to give me a shelter that would take men and women, so the pt and her son can stay not together in the same room, but as least in the same shelter.  This shelter accepts people starting at 5pm sharp.  So the pt and her son will have to go there, early to get a bed[place] [address]  [phone]    I called the pt and gave her this information, and that they should be there before 5pm to get a bed.

## 2018-03-16 NOTE — TELEPHONE ENCOUNTER
Called pt   She was at clinic today and asking Physician for help as she is currently living with her 18yr old out of their truck.    When asking her about this, she did state that she bounces around, usually staying in her vehicle outside of friends homes    Son will stay with other friends at times so he can finish school and stay on track.    She is able to get to friends ect to shower     Offered the one resource we have that allows for mother and an adult child - male to stay together- must call the central number tell them applying for the family place and they would be on a wait list right now looks about 4 weeks out.  Lbetz

## 2018-03-16 NOTE — PATIENT INSTRUCTIONS
Your medication list is printed, please keep this with you, it is helpful to bring this current list to any other medical appointments, the emergency room or hospital.    If you had lab testing today and your results are reassuring or normal they will be be mailed to you within 7 days.     If the lab tests need quick action we will call you with the results.   The phone number we will call with results is # [phone] (home) . If this is not the best number please call our clinic and change the number.    If you need any refills please call your pharmacy and they will contact us.    If you have any further concerns or wish to schedule another appointment you must call our office during normal business hours  [phone] (8-5:00 M-F)  If you have urgent medical questions that cannot wait  you may also call [phone] at any time of day.  If you have a medical emergency please call [phone].    Thank you for coming to [facility].      Referral for ( TEST )  :      Ophthalmology   LOCATION/PLACE/Provider :    [facility]  DATE & TIME :     3- at 10:45am  PHONE :     [phone]  FAX :     [phone]  Appointment made by clinic staff/:    Kaity

## 2018-03-16 NOTE — MR AVS SNAPSHOT
After Visit Summary   3/16/2018    Palmira Veloz    MRN: [id number]           Patient Information     Date Of Birth          1975        Visit Information        Provider Department      3/16/2018 1:40 PM Lauren Hayward DO [facility]        Today's Diagnoses     Left eye injury, initial encounter    -  1      Care Instructions    Your medication list is printed, please keep this with you, it is helpful to bring this current list to any other medical appointments, the emergency room or hospital.    If you had lab testing today and your results are reassuring or normal they will be be mailed to you within 7 days.     If the lab tests need quick action we will call you with the results.   The phone number we will call with results is # [phone] (home) . If this is not the best number please call our clinic and change the number.    If you need any refills please call your pharmacy and they will contact us.    If you have any further concerns or wish to schedule another appointment you must call our office during normal business hours  [phone] (8-5:00 M-F)  If you have urgent medical questions that cannot wait  you may also call [phone] at any time of day.  If you have a medical emergency please call [phone].    Thank you for coming to [facility].            Follow-ups after your visit        Additional Services     OPHTHALMOLOGY ADULT REFERRAL       Your provider has referred you to: [facility]    Please be aware that coverage of these services is subject to the terms and limitations of your health insurance plan.  Call member services at your health plan with any benefit or coverage questions.      Please bring the following with you to your appointment:    (1) Any X-Rays, CTs or MRIs which have been performed.  Contact the facility where they were done to arrange for  prior to your scheduled appointment.    (2) List of current medications  (3) This  "referral request   (4) Any documents/labs given to you for this referral                  Who to contact     Please call your clinic at [phone] to:    Ask questions about your health    Make or cancel appointments    Discuss your medicines    Learn about your test results    Speak to your doctor            Additional Information About Your Visit        MyChart Information     [employer] is an electronic gateway that provides easy, online access to your medical records. With [employer], you can request a clinic appointment, read your test results, renew a prescription or communicate with your care team.     To sign up for [employer] visit the website at [email or web address]   You will be asked to enter the access code listed below, as well as some personal information. Please follow the directions to create your username and password.     Your access code is: [email or web address]  Expires: 3/22/2018  1:00 PM     Your access code will  in 90 days. If you need help or a new code, please contact your [facility] or call [phone] for assistance.        Care EveryWhere ID     This is your Care EveryWhere ID. This could be used by other organizations to access your [place] medical records  [id number]        Your Vitals Were     Pulse Temperature Height Pulse Oximetry BMI (Body Mass Index)       80 98.5  F (36.9  C) (Oral) 5' 1.25\" (155.6 cm) 100% 27.7 kg/m2        Blood Pressure from Last 3 Encounters:   18 126/87   18 114/79   17 121/81    Weight from Last 3 Encounters:   18 147 lb 12.8 oz (67 kg)   18 146 lb 6.4 oz (66.4 kg)   17 146 lb (66.2 kg)              We Performed the Following     OPHTHALMOLOGY ADULT REFERRAL        Primary Care Provider Office Phone # Fax #    Nichoel Perez [phone]       [facility] [address] [place] [address]        Equal Access to Services     HARJIT CERDA AH: Hadii aad ku " alec Looney, pankajda lubenjiadaha, qatamikota kapartha renteria, juli cheloin hayaan davecasimiro mahendraginny lajoannemarlen helio. So [facility] [phone].    ATENCIÓN: Si habla español, tiene a zamudio disposición servicios gratuitos de asistencia lingüística. Es al [phone].    We comply with applicable federal civil rights laws and [place] laws. We do not discriminate on the basis of race, color, national origin, age, disability, sex, sexual orientation, or gender identity.            Thank you!     Thank you for choosing [facility]  for your care. Our goal is always to provide you with excellent care. Hearing back from our patients is one way we can continue to improve our services. Please take a few minutes to complete the written survey that you may receive in the mail after your visit with us. Thank you!             Your Updated Medication List - Protect others around you: Learn how to safely use, store and throw away your medicines at www.disposemymeds.org.          This list is accurate as of 3/16/18  2:01 PM.  Always use your most recent med list.                   Brand Name Dispense Instructions for use Diagnosis    acetaminophen 325 MG tablet    TYLENOL    100 tablet    Take 2 tablets (650 mg) by mouth every 4 hours as needed for mild pain    Upper respiratory tract infection, unspecified type       benzonatate 100 MG capsule    TESSALON    42 capsule    Take 1 capsule (100 mg) by mouth 3 times daily as needed for cough    Upper respiratory tract infection, unspecified type       escitalopram 20 MG tablet    LEXAPRO    30 tablet    Take 1 tablet (20 mg) by mouth daily    Depression with anxiety, Insomnia, unspecified type       medroxyPROGESTERone 150 MG/ML injection   Start taking on:  5/29/2018    DEPO-PROVERA    1 mL    Inject 1 mL (150 mg) into the muscle every 3 months for 21 days    Encounter for surveillance of injectable contraceptive       order for DME     1 Device    Equipment being ordered: silicone  sleeve for right 5th toe (bone spur)    Pain of toe of right foot       TOPAMAX PO      Take 10 mg by mouth daily    Encounter for surveillance of injectable contraceptive, Other depression       zolpidem 5 MG tablet    AMBIEN    14 tablet    Take 1 tablet (5 mg) by mouth nightly as needed for sleep May repeat x 1 if needed.    Insomnia, unspecified type
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
## 2018-04-08 ENCOUNTER — HEALTH MAINTENANCE LETTER (OUTPATIENT)
Age: 43
End: 2018-04-08

## 2018-05-30 ENCOUNTER — ALLIED HEALTH/NURSE VISIT (OUTPATIENT)
Dept: FAMILY MEDICINE | Facility: CLINIC | Age: 43
End: 2018-05-30
Payer: COMMERCIAL

## 2018-05-30 VITALS
OXYGEN SATURATION: 99 % | DIASTOLIC BLOOD PRESSURE: 99 MMHG | TEMPERATURE: 98 F | HEART RATE: 83 BPM | WEIGHT: 142.8 LBS | BODY MASS INDEX: 26.76 KG/M2 | SYSTOLIC BLOOD PRESSURE: 149 MMHG

## 2018-05-30 DIAGNOSIS — Z30.42 DEPO-PROVERA CONTRACEPTIVE STATUS: Primary | ICD-10-CM

## 2018-05-30 DIAGNOSIS — Z30.42 ENCOUNTER FOR SURVEILLANCE OF INJECTABLE CONTRACEPTIVE: ICD-10-CM

## 2018-05-30 RX ORDER — MEDROXYPROGESTERONE ACETATE 150 MG/ML
150 INJECTION, SUSPENSION INTRAMUSCULAR
Qty: 1 ML | Refills: 0 | OUTPATIENT
Start: 2018-08-15 | End: 2018-09-05

## 2018-05-30 NOTE — MR AVS SNAPSHOT
After Visit Summary   2018    Palmira Veloz    MRN: [id number]           Patient Information     Date Of Birth          1975        Visit Information        Provider Department      2018 10:00 AM [facility]        Today's Diagnoses     Depo-Provera contraceptive status    -  1       Follow-ups after your visit        Who to contact     Please call your clinic at [phone] to:    Ask questions about your health    Make or cancel appointments    Discuss your medicines    Learn about your test results    Speak to your doctor            Additional Information About Your Visit        MyChart Information     Comput[employer] is an electronic gateway that provides easy, online access to your medical records. With Computime, you can request a clinic appointment, read your test results, renew a prescription or communicate with your care team.     To sign up for Comput[employer] visit the website at [email or web address]   You will be asked to enter the access code listed below, as well as some personal information. Please follow the directions to create your username and password.     Your access code is: [email or web address]  Expires: 2018 10:30 AM     Your access code will  in 90 days. If you need help or a new code, please contact your [facility] or call [phone] for assistance.        Care EveryWhere ID     This is your Care EveryWhere ID. This could be used by other organizations to access your [place] medical records  [id number]        Your Vitals Were     Pulse Temperature Pulse Oximetry BMI (Body Mass Index)          83 98  F (36.7  C) (Oral) 99% 26.76 kg/m2         Blood Pressure from Last 3 Encounters:   18 (!) 149/99   18 126/87   18 114/79    Weight from Last 3 Encounters:   18 142 lb 12.8 oz (64.8 kg)   18 147 lb 12.8 oz (67 kg)   18 146 lb 6.4 oz (66.4 kg)              We Performed the  Following     INJECTION INTRAMUSCULAR OR SUB-Q     medroxyPROGESTERone (DEPO-PROVERA) injection 150 mg (Charge)        Primary Care Provider Office Phone # Fax #    Nichole Perez [phone]       [facility] [address] [place] [address]        Equal Access to Services     HARJIT CERDA : Hadii aad ku hadasho Soomaali, waaxda luqadaha, qaybta kaalmada adeegyada, waxay idiin hayaan adecasimrio khmanuelsh lajoannen . So [facility] [phone].    ATENCIÓN: Si habla español, tiene a zamudio disposición servicios gratuitos de asistencia lingüística. Llame al [phone].    We comply with applicable federal civil rights laws and [place] laws. We do not discriminate on the basis of race, color, national origin, age, disability, sex, sexual orientation, or gender identity.            Thank you!     Thank you for choosing [facility]  for your care. Our goal is always to provide you with excellent care. Hearing back from our patients is one way we can continue to improve our services. Please take a few minutes to complete the written survey that you may receive in the mail after your visit with us. Thank you!             Your Updated Medication List - Protect others around you: Learn how to safely use, store and throw away your medicines at www.disposemymeds.org.          This list is accurate as of 5/30/18 10:30 AM.  Always use your most recent med list.                   Brand Name Dispense Instructions for use Diagnosis    acetaminophen 325 MG tablet    TYLENOL    100 tablet    Take 2 tablets (650 mg) by mouth every 4 hours as needed for mild pain    Upper respiratory tract infection, unspecified type       benzonatate 100 MG capsule    TESSALON    42 capsule    Take 1 capsule (100 mg) by mouth 3 times daily as needed for cough    Upper respiratory tract infection, unspecified type       escitalopram 20 MG tablet    LEXAPRO    30 tablet    Take 1 tablet (20 mg) by mouth daily     Depression with anxiety, Insomnia, unspecified type       medroxyPROGESTERone 150 MG/ML injection    DEPO-PROVERA    1 mL    Inject 1 mL (150 mg) into the muscle every 3 months for 21 days    Encounter for surveillance of injectable contraceptive       order for DME     1 Device    Equipment being ordered: silicone sleeve for right 5th toe (bone spur)    Pain of toe of right foot       TOPAMAX PO      Take 10 mg by mouth daily    Encounter for surveillance of injectable contraceptive, Other depression       zolpidem 5 MG tablet    AMBIEN    14 tablet    Take 1 tablet (5 mg) by mouth nightly as needed for sleep May repeat x 1 if needed.    Insomnia, unspecified type

## 2018-05-30 NOTE — NURSING NOTE
I administered the following to Palmira Veloz.    MEDICATION: Depo Provera 150mg  ROUTE: IM  SITE: Deltoid - Left  DOSE: 1 ml  LOT #: [id number]  :  [employer]  EXPIRATION DATE:  9/30/19  NDC#: 8527-6369-51     Was entire vial of medication used? Yes    Name of provider who requested the injection: Dr. Mo  Name of provider on site (faculty or community preceptor) at the time of performing the injection: dr. campbell    RTC: 8/15/18 to 9/5/18    Madhavi Campbell [facility]

## 2018-09-05 ENCOUNTER — ALLIED HEALTH/NURSE VISIT (OUTPATIENT)
Dept: FAMILY MEDICINE | Facility: CLINIC | Age: 43
End: 2018-09-05
Payer: COMMERCIAL

## 2018-09-05 VITALS
WEIGHT: 145 LBS | SYSTOLIC BLOOD PRESSURE: 112 MMHG | OXYGEN SATURATION: 100 % | BODY MASS INDEX: 27.17 KG/M2 | RESPIRATION RATE: 16 BRPM | DIASTOLIC BLOOD PRESSURE: 79 MMHG | HEART RATE: 84 BPM

## 2018-09-05 DIAGNOSIS — Z30.42 ENCOUNTER FOR SURVEILLANCE OF INJECTABLE CONTRACEPTIVE: ICD-10-CM

## 2018-09-05 DIAGNOSIS — Z30.42 DEPO-PROVERA CONTRACEPTIVE STATUS: Primary | ICD-10-CM

## 2018-09-05 RX ORDER — MEDROXYPROGESTERONE ACETATE 150 MG/ML
150 INJECTION, SUSPENSION INTRAMUSCULAR
Qty: 1 ML | Refills: 0 | OUTPATIENT
Start: 2018-09-05 | End: 2018-12-13

## 2018-09-05 NOTE — NURSING NOTE
I administered the following to Palmira Veloz.    MEDICATION: Depo Provera 150mg  ROUTE: IM  SITE: LUQ - Gluteus  DOSE: 150mg/1mL  LOT #: [id number]  :  [employer]  EXPIRATION DATE:  09/2019  NDC#: 9511-6729-74     Was entire vial of medication used? Yes    Did the patient bring this medication to the clinic to be injected? No    Name of provider who requested the injection: Dr. Harper  Name of provider on site (faculty or community preceptor) at the time of performing the injection: Dr. Willie CONDE, JIMÉNEZ, [facility]    Patient is to return from 11-22 to 12- for next depo. Will need office visit.

## 2018-09-05 NOTE — MR AVS SNAPSHOT
After Visit Summary   9/5/2018    Palmira Veloz    MRN: [id number]           Patient Information     Date Of Birth          1975        Visit Information        Provider Department      9/5/2018 11:00 AM Nurse, [facility]        Today's Diagnoses     Depo-Provera contraceptive status    -  1       Follow-ups after your visit        Who to contact     Please call your clinic at [phone] to:    Ask questions about your health    Make or cancel appointments    Discuss your medicines    Learn about your test results    Speak to your doctor            Additional Information About Your Visit        Care EveryWhere ID     This is your Care EveryWhere ID. This could be used by other organizations to access your [place] medical records  [id number]         Blood Pressure from Last 3 Encounters:   05/30/18 (!) 149/99   03/16/18 126/87   03/13/18 114/79    Weight from Last 3 Encounters:   05/30/18 142 lb 12.8 oz (64.8 kg)   03/16/18 147 lb 12.8 oz (67 kg)   03/13/18 146 lb 6.4 oz (66.4 kg)              Today, you had the following     No orders found for display       Primary Care Provider Office Phone # Fax #    Nichole Sammi Perez [phone]       [facility] [address] [place] [address]        Equal Access to Services     HARJIT CERDA AH: Hadii aad ku hadasho Soomaali, waaxda luqadaha, qaybta kaalmada adeegyada, waxay idiin hayaan christ holland latimothy cadet. So [facility] [phone].    ATENCIÓN: Si habla español, tiene a zamudio disposición servicios gratjuvencioos de asistencia lingüística. ame al [phone].    We comply with applicable federal civil rights laws and [place] laws. We do not discriminate on the basis of race, color, national origin, age, disability, sex, sexual orientation, or gender identity.            Thank you!     Thank you for choosing [facility]  for your care. Our goal is always to provide you with excellent care. Hearing  back from our patients is one way we can continue to improve our services. Please take a few minutes to complete the written survey that you may receive in the mail after your visit with us. Thank you!             Your Updated Medication List - Protect others around you: Learn how to safely use, store and throw away your medicines at www.disposemymeds.org.          This list is accurate as of 9/5/18  1:05 PM.  Always use your most recent med list.                   Brand Name Dispense Instructions for use Diagnosis    acetaminophen 325 MG tablet    TYLENOL    100 tablet    Take 2 tablets (650 mg) by mouth every 4 hours as needed for mild pain    Upper respiratory tract infection, unspecified type       benzonatate 100 MG capsule    TESSALON    42 capsule    Take 1 capsule (100 mg) by mouth 3 times daily as needed for cough    Upper respiratory tract infection, unspecified type       escitalopram 20 MG tablet    LEXAPRO    30 tablet    Take 1 tablet (20 mg) by mouth daily    Depression with anxiety, Insomnia, unspecified type       medroxyPROGESTERone 150 MG/ML injection    DEPO-PROVERA    1 mL    Inject 1 mL (150 mg) into the muscle every 3 months for 21 days    Encounter for surveillance of injectable contraceptive       order for DME     1 Device    Equipment being ordered: silicone sleeve for right 5th toe (bone spur)    Pain of toe of right foot       TOPAMAX PO      Take 10 mg by mouth daily    Encounter for surveillance of injectable contraceptive, Other depression       zolpidem 5 MG tablet    AMBIEN    14 tablet    Take 1 tablet (5 mg) by mouth nightly as needed for sleep May repeat x 1 if needed.    Insomnia, unspecified type

## 2018-09-05 NOTE — TELEPHONE ENCOUNTER
Message to physician: will required office visit for next depo    Date of last visit: 3/16/2018     Date of next visit if scheduled: Visit date not found       Last Comprehensive Metabolic Panel:  Sodium   Date Value Ref Range Status   02/04/2015 142 136 - 145 mmol/L Final     Potassium   Date Value Ref Range Status   02/04/2015 4.6 3.5 - 5.0 mmol/L Final     Chloride   Date Value Ref Range Status   02/04/2015 105 98 - 107 mmol/L Final     Glucose   Date Value Ref Range Status   02/04/2015 67 (L) 70 - 125 mg/dL Final     Urea Nitrogen   Date Value Ref Range Status   02/04/2015 10 8 - 22 mg/dL Final     Creatinine   Date Value Ref Range Status   02/04/2015 0.81 0.60 - 1.10 mg/dL Final     GFR Estimate   Date Value Ref Range Status   02/04/2015 >60 >60 mL/min/1.73m2 Final     Calcium   Date Value Ref Range Status   02/04/2015 9.4 8.5 - 10.5 mg/dL Final       BP Readings from Last 3 Encounters:   05/30/18 (!) 149/99   03/16/18 126/87   03/13/18 114/79       Lab Results   Component Value Date    A1C 6.1 02/04/2015                Please complete refill and CLOSE ENCOUNTER.  Closing the encounter signifies the refill is complete.

## 2018-12-13 ENCOUNTER — OFFICE VISIT (OUTPATIENT)
Dept: FAMILY MEDICINE | Facility: CLINIC | Age: 43
End: 2018-12-13
Payer: COMMERCIAL

## 2018-12-13 ENCOUNTER — TELEPHONE (OUTPATIENT)
Dept: FAMILY MEDICINE | Facility: CLINIC | Age: 43
End: 2018-12-13

## 2018-12-13 VITALS
OXYGEN SATURATION: 98 % | HEIGHT: 61 IN | SYSTOLIC BLOOD PRESSURE: 128 MMHG | BODY MASS INDEX: 28.36 KG/M2 | TEMPERATURE: 98 F | HEART RATE: 76 BPM | RESPIRATION RATE: 12 BRPM | DIASTOLIC BLOOD PRESSURE: 82 MMHG | WEIGHT: 150.2 LBS

## 2018-12-13 DIAGNOSIS — Z30.42 DEPO-PROVERA CONTRACEPTIVE STATUS: ICD-10-CM

## 2018-12-13 DIAGNOSIS — Z23 ENCOUNTER FOR IMMUNIZATION: ICD-10-CM

## 2018-12-13 DIAGNOSIS — F17.200 TOBACCO USE DISORDER: ICD-10-CM

## 2018-12-13 DIAGNOSIS — Z00.00 ROUTINE GENERAL MEDICAL EXAMINATION AT A HEALTH CARE FACILITY: Primary | ICD-10-CM

## 2018-12-13 RX ORDER — MEDROXYPROGESTERONE ACETATE 150 MG/ML
150 INJECTION, SUSPENSION INTRAMUSCULAR
Status: DISPENSED | OUTPATIENT
Start: 2018-12-13 | End: 2019-12-08

## 2018-12-13 RX ADMIN — MEDROXYPROGESTERONE ACETATE 150 MG: 150 INJECTION, SUSPENSION INTRAMUSCULAR at 11:53

## 2018-12-13 ASSESSMENT — MIFFLIN-ST. JEOR: SCORE: 1282.17

## 2018-12-13 NOTE — PATIENT INSTRUCTIONS
Start using nicotine patch and gum when you are ready to quit smoking.   Cut back on daily marijuana use.       Preventive Health Recommendations  Female Ages 40 to 49    Yearly exam:     See your health care provider every year in order to  1. Review health changes.   2. Discuss preventive care.    3. Review your medicines if your doctor prescribed any.      Get a Pap test every three years (unless you have an abnormal result and your provider advises testing more often).      If you get Pap tests with HPV test, you only need to test every 5 years, unless you have an abnormal result. You do not need a Pap test if your uterus was removed (hysterectomy) and you have not had cancer.      You should be tested each year for STDs (sexually transmitted diseases), if you're at risk.     Ask your doctor if you should have a mammogram.      Have a colonoscopy (test for colon cancer) if someone in your family has had colon cancer or polyps before age 50.       Have a cholesterol test every 5 years.       Have a diabetes test (fasting glucose) after age 45. If you are at risk for diabetes, you should have this test every 3 years.    Shots: Get a flu shot each year. Get a tetanus shot every 10 years.     Nutrition:     Eat at least 5 servings of fruits and vegetables each day.    Eat whole-grain bread, whole-wheat pasta and brown rice instead of white grains and rice.    Get adequate Calcium and Vitamin D.      Lifestyle    Exercise at least 150 minutes a week (an average of 30 minutes a day, 5 days a week). This will help you control your weight and prevent disease.    Limit alcohol to one drink per day.    No smoking.     Wear sunscreen to prevent skin cancer.    See your dentist every six months for an exam and cleaning.      Referral for ( TEST )  :      OB/GYN  LOCATION/PLACE/Provider :    [facility] [address] [place] [id number]  DATE & TIME :     1-9-2019   PHONE :     [phone]  FAX :      [phone]  Appointment made by clinic staff/:    Kaity        2/1/19 Called and left voice message for medical records to fax notes to   Attention: Juan Mckeon CMA (Esthela)

## 2018-12-13 NOTE — NURSING NOTE
Prior to injection, I verified the patient identity using patient's name and date of birth. Patient was instructed to report any adverse reaction to me immediately.    I administered the injection to Palmira Veloz.    Was entire vial of medication used? Yes    Did the patient bring this medication to the clinic to be injected? No    Name of provider who requested the injection: Dr Perez  Name of provider on site (faculty or community preceptor) at the time of performing the injection: Dr. Troy    Date of next injection: 02/28/2019-03/21/2019  Date of next office visit with provider to renew medication plan (must be seen annually): 12/13/2019     Andrew Sutton CMA        Injectable influenza vaccine documentation    1. Has the patient received the information for the influenza vaccine? YES    2. Does the patient have a severe allergy to eggs (Patients with a severe egg allergy should be assessed by a medical provider, RN, or clinical pharmacist. If they receive the influenza vaccine, please have them observed for 15 minutes.)? No    3. Has the patient had an allergic reaction to previous influenza vaccines? No    4. Has the patient had any severe allergic reactions to past influenza vaccines ? No       5. Does patient have a history of Guillain-Beulah syndrome? No      Based on responses above, I administered the influenza vaccine.  Andrew Sutton CMA

## 2018-12-13 NOTE — PROGRESS NOTES
Female Physical Note    Concerns today:     Homeless x 1.5 years.   Just found housing.   Looking for work.   Tobacco since age 12. Wants to quit. Tried patches and gum. Gum was gross - chewed like regular gum. 1/4 PPD. Used to be more, PPD. Has quit before by eating a lot of candy.   Daily THC not interested in quitting. Doesn't think impacting live.   No bleeding/spotting on depo. On depo for years. Has 2 kids ages 26 and 19. Doesn't want IUD or implant. Would like sterilization.     ROS:  CONSTITUTIONAL: no fatigue, no unexpected change in weight  SKIN: no worrisome rashes, no worrisome moles, no worrisome lesions  EYES: no acute vision problems or changes  ENT: no ear problems, no mouth problems, no throat problems  RESP: no significant cough, no shortness of breath  CV: no chest pain, no palpitations, no new or worsening peripheral edema  GI: no nausea, no vomiting, no constipation, no diarrhea    Sexually Active: No  Sexual concerns: No   Contraception:desires   P:   Menarche:  No LMP recorded. Patient is not currently having periods (Reason: Amenorrhea). Menopausal since: 2 yrs?  STD History: Neg  Last Pap Smear Date:  normal  Abnormal Pap History: None    Patient Active Problem List   Diagnosis     Health Care Home     Conversion disorder     Contact dermatitis and other eczema, due to unspecified cause     Esophageal reflux     Insomnia     Other and unspecified derangement of medial meniscus     Pain in joint, shoulder region     Major depressive disorder, recurrent episode (H)     Tobacco use disorder     Knee cap dislocation     Acute reaction to stress     Depo-Provera contraceptive status       Current Outpatient Medications   Medication Sig Dispense Refill     zolpidem (AMBIEN) 5 MG tablet Take 1 tablet (5 mg) by mouth nightly as needed for sleep May repeat x 1 if needed. 14 tablet 0     acetaminophen (TYLENOL) 325 MG tablet Take 2 tablets (650 mg) by mouth every 4 hours as needed for  mild pain (Patient not taking: Reported on 3/16/2018) 100 tablet 0     benzonatate (TESSALON) 100 MG capsule Take 1 capsule (100 mg) by mouth 3 times daily as needed for cough (Patient not taking: Reported on 3/16/2018) 42 capsule 0     escitalopram (LEXAPRO) 20 MG tablet Take 1 tablet (20 mg) by mouth daily (Patient not taking: Reported on 12/13/2018) 30 tablet 1     medroxyPROGESTERone (DEPO-PROVERA) 150 MG/ML injection Inject 1 mL (150 mg) into the muscle every 3 months 1 mL 0     medroxyPROGESTERone (DEPO-PROVERA) 150 MG/ML injection Inject 1 mL (150 mg) into the muscle every 3 months for 1 day 1 mL 0     order for DME Equipment being ordered: silicone sleeve for right 5th toe (bone spur) 1 Device 0     Topiramate (TOPAMAX PO) Take 10 mg by mouth daily         Past Medical History:   Diagnosis Date     Knee cap dislocation 11/17/2013     Unspecified acute reaction to stress 11/17/2013        Family History     Problem (# of Occurrences) Relation (Name,Age of Onset)    Asthma (1) Brother    Coronary Artery Disease (1) Mother    Heart Failure (1) Mother       Negative family history of: Cerebrovascular Disease          Problem List Medication List and Allergy List were reviewed.    Patient is an established patient of this clinic..    Social History     Tobacco Use     Smoking status: Current Every Day Smoker     Types: Cigarettes     Smokeless tobacco: Never Used     Tobacco comment: Pt. is trying to quit smoking.  Day #2   Substance Use Topics     Alcohol use: Yes     Alcohol/week: 0.0 oz     Comment: Occasionally.      Single  Children ? yes ages 19 and 26    Has anyone hurt you physically, for example by pushing, hitting, slapping or kicking you or forcing you to have sex? Denies  Do you feel threatened or controlled by a partner, ex-partner or anyone in your life? Denies    RISK BEHAVIORS AND HEALTHY HABITS:  Tobacco Use/Smoking: Details daily 1/4 PPD  Illicit Drug Use: Details THC daily  Do you use alcohol?  "No  Diet (5-7 servings of fruits/veg daily): No   Exercise (30 min accumulated most days):No  Dental Care: No   Calcium 1500 mg/d:  Yes  Seat Belt Use: Yes     ASA use (>3% risk in 5 y):  Testing not indicated , Pap/HPV cotest every 5 years for women 30-65   Date done 2015  Result(s) nromal w HPV neg and HIV screening:  Date done 2015  Result(s) neg  Breast CA Screening (>39 yo or 10 y before 1st degree relative diagnosis): Recommended and patient declined testing.  CV Risk based on Pooled Cohort Risk: The ASCVD Risk score (Jay DONIS Jr., et al., 2013) failed to calculate for the following reasons:    Cannot find a previous HDL lab    Cannot find a previous total cholesterol lab    Pooled Cohort Risk Calculator    Immunization History   Administered Date(s) Administered     HepB 12/19/2006, 03/14/2007, 06/22/2010     Influenza (H1N1) 06/22/2010     Influenza (IIV3) PF 12/19/2006, 11/18/2010, 10/20/2011, 10/23/2012     Influenza Vaccine IM 3yrs+ 4 Valent IIV4 10/25/2013, 10/19/2016, 09/14/2017     MMR 02/04/2015     Pneumococcal 23 valent 10/23/2012, 08/16/2013     TD (ADULT, 7+) 12/19/2006     Tdap (Adacel,Boostrix) 10/20/2011      EXAMINATION:   /82   Pulse 76   Temp 98  F (36.7  C) (Oral)   Resp 12   Ht 1.555 m (5' 1.22\")   Wt 68.1 kg (150 lb 3.2 oz)   SpO2 98%   BMI 28.18 kg/m    GENERAL: healthy, alert and no distress  EYES: Eyes grossly normal to inspection, extraocular movements - intact, and PERRL  HENT: ear canals- normal; TMs- normal; Nose- normal; Mouth- no ulcers, no lesions  NECK: no tenderness, no adenopathy, no asymmetry, no masses, no stiffness; thyroid- normal to palpation  RESP: lungs clear to auscultation - no rales, no rhonchi, no wheezes  BREAST: no masses, no tenderness, no nipple discharge, no palpable axillary masses or adenopathy  CV: regular rates and rhythm, normal S1 S2, no S3 or S4 and no murmur, no click or rub -  ABDOMEN: soft, no tenderness, no  hepatosplenomegaly, no " masses, normal bowel sounds  MS: extremities- no gross deformities noted, no edema  SKIN: no suspicious lesions, no rashes  NEURO: strength and tone- normal, sensory exam- grossly normal, mentation- intact, speech- normal, reflexes- symmetric  BACK: no CVA tenderness, no paralumbar tenderness  - female: declined  PSYCH: Alert and oriented times 3; speech- coherent , normal rate and volume; able to articulate logical thoughts, able to abstract reason, no tangential thoughts, no hallucinations or delusions, affect- normal  LYMPHATICS: ant. cervical- normal, post. cervical- normal, axillary- normal, supraclavicular- normal, inguinal- normal    ASSESSMENT:  1. Routine general medical examination at a health care facility  Desires tubal ligation. Congratulated on new stability going forward with housing and pursuing employment.   - OB/GYN REFERRAL    2. Tobacco use disorder  Wants to try replacement again. Also discouraged daily THC use.   - nicotine (NICODERM CQ) 7 MG/24HR 24 hr patch; Place 1 patch onto the skin every 24 hours  Dispense: 90 patch; Refill: 3  - nicotine (NICORETTE) 2 MG gum; Place 1 each (2 mg) inside cheek as needed for smoking cessation  Dispense: 100 each; Refill: 3    3. Depo-Provera contraceptive status  - medroxyPROGESTERone (DEPO-PROVERA) injection 150 mg; Inject 1 mL (150 mg) into the muscle every 3 months  - INJECTION INTRAMUSCULAR OR SUB-Q    4. Encounter for immunization  - flu shot given today  - ADMIN VACCINE, INITIAL    Follow up 1 month for tobacco, THC use    Nichole Perez MD, MPH  [facility] Family Medicine Resident, PGY3    Precepted patient with Harrison Troy MD

## 2018-12-13 NOTE — TELEPHONE ENCOUNTER
RX return from [facility] insurance not coved for Nicotine gum is OTC,I did called [facility] were told same thing not covered and I also did called patient back and informed patient with this information and patient says she not going to pay out of package and just forget it per patient.

## 2018-12-28 NOTE — PROGRESS NOTES
I have personally reviewed the history and examination as documented by Dr. Perez.  I was present during key portions of the visit and agree with the assessment and plan as documented for 43 yr old female with depression, tobacco use disorder, previous homelessness here for well visit. Counseled on smoking cessation. Contraceptive mgmt given.  Precautions given. Age-appropriate anticipatory guidance given.     Harrison Troy MD  December 28, 2018  3:42 PM

## 2019-03-12 ENCOUNTER — ALLIED HEALTH/NURSE VISIT (OUTPATIENT)
Dept: FAMILY MEDICINE | Facility: CLINIC | Age: 44
End: 2019-03-12
Payer: COMMERCIAL

## 2019-03-12 VITALS
BODY MASS INDEX: 28.82 KG/M2 | WEIGHT: 156.6 LBS | HEART RATE: 84 BPM | DIASTOLIC BLOOD PRESSURE: 80 MMHG | RESPIRATION RATE: 16 BRPM | SYSTOLIC BLOOD PRESSURE: 118 MMHG | HEIGHT: 62 IN | TEMPERATURE: 97.6 F | OXYGEN SATURATION: 98 %

## 2019-03-12 DIAGNOSIS — Z30.42 DEPO-PROVERA CONTRACEPTIVE STATUS: ICD-10-CM

## 2019-03-12 DIAGNOSIS — Z30.42 DEPO-PROVERA CONTRACEPTIVE STATUS: Primary | ICD-10-CM

## 2019-03-12 RX ORDER — MEDROXYPROGESTERONE ACETATE 150 MG/ML
150 INJECTION, SUSPENSION INTRAMUSCULAR
Qty: 1 ML | Refills: 0 | OUTPATIENT
Start: 2019-03-12 | End: 2019-03-12

## 2019-03-12 RX ORDER — MEDROXYPROGESTERONE ACETATE 150 MG/ML
150 INJECTION, SUSPENSION INTRAMUSCULAR ONCE
Status: COMPLETED | OUTPATIENT
Start: 2019-03-12 | End: 2019-03-12

## 2019-03-12 RX ORDER — MEDROXYPROGESTERONE ACETATE 150 MG/ML
150 INJECTION, SUSPENSION INTRAMUSCULAR
Qty: 1 ML | Refills: 0 | Status: CANCELLED | OUTPATIENT
Start: 2019-03-12

## 2019-03-12 RX ADMIN — MEDROXYPROGESTERONE ACETATE 150 MG: 150 INJECTION, SUSPENSION INTRAMUSCULAR at 10:36

## 2019-03-12 ASSESSMENT — MIFFLIN-ST. JEOR: SCORE: 1315.64

## 2019-03-12 NOTE — NURSING NOTE
BP: 118/80    LAST PAP/EXAM: No results found for: PAP  URINE HCG:not indicated    The following medication was given:     MEDICATION: Depo Provera 150mg  ROUTE: IM  SITE: Deltoid - Right  : TEVA  LOT #: [id number]  EXP:1-20  NEXT INJECTION DUE: 5/28/19 - 6/18/19   Provider: TONO  Person who administered medication: CARMEN ADAME  Witnessed by: Juan Mckeon CMA      Prior to medication administration, I verified the patient identity using patient's name and date of birth. Patient was instructed to report any adverse reaction to me immediately.    I administered Depo Medroxyprogesterone 150 mg/1 mL to Palmira Veloz.    For injections only, was entire vial of medication used? Yes    Did the patient bring this medication to the clinic to be administered? No    Name of provider who requested the medication administration: Dr. Anderson  Name of provider on site (faculty or community preceptor) at the time of performing the medication administration: Dr. Anderson    Date of next administration: 5/28/19-6/18/19  Date of next office visit with provider to renew medication plan (must be seen annually): n/a    Juan Mckeon CMA

## 2019-03-13 RX ORDER — MEDROXYPROGESTERONE ACETATE 150 MG/ML
150 INJECTION, SUSPENSION INTRAMUSCULAR
Qty: 1 ML | Refills: 0 | OUTPATIENT
Start: 2019-05-28 | End: 2020-07-28

## 2019-03-19 ENCOUNTER — OFFICE VISIT (OUTPATIENT)
Dept: FAMILY MEDICINE | Facility: CLINIC | Age: 44
End: 2019-03-19
Payer: COMMERCIAL

## 2019-03-19 VITALS
OXYGEN SATURATION: 100 % | TEMPERATURE: 98 F | WEIGHT: 156.6 LBS | SYSTOLIC BLOOD PRESSURE: 121 MMHG | DIASTOLIC BLOOD PRESSURE: 83 MMHG | HEART RATE: 64 BPM | RESPIRATION RATE: 18 BRPM | BODY MASS INDEX: 29.11 KG/M2

## 2019-03-19 DIAGNOSIS — Z30.42 DEPO-PROVERA CONTRACEPTIVE STATUS: ICD-10-CM

## 2019-03-19 DIAGNOSIS — Z71.9 CONSULTATION WITHOUT SPECIFIC COMPLAINT: Primary | ICD-10-CM

## 2019-03-19 DIAGNOSIS — F17.200 TOBACCO USE DISORDER: ICD-10-CM

## 2019-03-19 NOTE — PROGRESS NOTES
Preceptor Attestation:   Patient seen, evaluated and discussed with the resident. I have verified the content of the note, which accurately reflects my assessment of the patient and the plan of care.  Supervising Physician:Yady Teixeira MD  [facility]

## 2019-03-19 NOTE — PROGRESS NOTES
History   Palmira Veloz is a 43 year old female presenting for:  Results (last pap 2/4/15 - abnormal needed coloposcopy? Explain more to patient) and Medication Reconciliation (Needs attention )    Patient called to follow up on abnormal pap test. She was here last week for depo shot and was told by [facility] that the result from her pap test in 2015 was abnormal requiring colposcopy. Patient was not aware of being told this in the past and is concerned. She would like more information about colposcopy.     She has not quit smoking. Had recent family stressors. Has stable housing now. She has rx for nicotine replacement but not ready to use it or set a quit date.     The patient speaks English, so no  was used for this visit.   Medical and social history and medications reviewed with patient.   Exam   /83   Pulse 64   Temp 98  F (36.7  C) (Oral)   Resp 18   Wt 71 kg (156 lb 9.6 oz)   LMP 02/25/2019   SpO2 100%   BMI 29.11 kg/m    Gen: NAD  Resp: respirations unlabored  Skin: no rashes on exposed skin  Psych: normal mood and affect, pleasant    Medical Decision-Making     1. Consultation without specific complaint  Reviewed prior pap results. No abnormal result in system found. Last pap 2/2015 was normal and HPV testing negative. Provider recorded note stated abnormal result requiring colposcopy, but this appears to have been an error in documentation. I reviewed this with patient and apologized for the stress this may have caused her. She is relieved and not upset. I offered to do pap today to ensure normal, since she is due within the next year, and she declines and will wait until next February.     2. Tobacco use disorder  Would like to quit but not ready to set quit date or discuss more. Offered quit line and can call for patch/gum replacement if current rx expires.     3. Depo-Provera contraceptive status  Had expressed interest in tubal ligation last visit. Now hesitant. Offered LARC. She  prefers to stay with depo for now.     Follow up: next year for cpe with pap (due 2/2020), prn if interested in talking more about tobacco cessation    Nichole Perez MD, MPH  [facility] Resident     Precepted patient with Yady Teixeira MD    Options for treatment and follow-up care were reviewed with the patient and/or guardian. Palmira Veloz and/or guardian engaged in the decision making process and verbalized understanding of the options discussed and agreed with the final plan.

## 2019-06-10 ENCOUNTER — ALLIED HEALTH/NURSE VISIT (OUTPATIENT)
Dept: FAMILY MEDICINE | Facility: CLINIC | Age: 44
End: 2019-06-10
Payer: COMMERCIAL

## 2019-06-10 VITALS — WEIGHT: 153 LBS | BODY MASS INDEX: 28.89 KG/M2 | HEIGHT: 61 IN

## 2019-06-10 DIAGNOSIS — Z30.42 DEPO-PROVERA CONTRACEPTIVE STATUS: Primary | ICD-10-CM

## 2019-06-10 ASSESSMENT — MIFFLIN-ST. JEOR: SCORE: 1295.34

## 2019-06-10 NOTE — LETTER
Ruth 10, 2019      RE: Palmira Veloz  [address]  [place] [address]       Dear Palmira,    As we review our records, we see that you are due for your Depo shot. You may call the clinic at [phone] and make a nurse only appointment for yourself any time between 08/26/2019 and 09/16/2019. If you do not schedule your appointment within the dates listed above a physician visit may be required. If you have any concerns or questions regarding the shot, please make an appointment with your primary care provider. Thank you for letting us be part of your care.     Sincerely,     Beth Peña  [facility]  Hours: Monday-Friday 8:00 am - 5:00 pm  Phone Number: [phone]

## 2019-06-10 NOTE — NURSING NOTE
Prior to medication administration, I verified the patient identity using patient's name and date of birth. Patient was instructed to report any adverse reaction to me immediately.    I administered Depo to Palmira Veloz.    For injections only, was entire vial of medication used? Yes    Did the patient bring this medication to the clinic to be administered? No    Name of provider who requested the medication administration: {Riccardo  Name of provider on site (faculty or community preceptor) at the time of performing the medication administration: Riccardo    Date of next administration: 08/26-9/16  Date of next office visit with provider to renew medication plan (must be seen annually):Pt is due for renewal, when she calls or come in august 2019 please schedule with a provider.    Beth Peña, CMA

## 2019-06-17 NOTE — NURSING NOTE
Medication given: Depo   NDC:8531-5500-35  Lot:[id number]  Exp:10/20    Given on 6/10/2019      Beth Peña CMA

## 2019-12-13 ENCOUNTER — OFFICE VISIT (OUTPATIENT)
Dept: FAMILY MEDICINE | Facility: CLINIC | Age: 44
End: 2019-12-13
Payer: COMMERCIAL

## 2019-12-13 VITALS
DIASTOLIC BLOOD PRESSURE: 80 MMHG | RESPIRATION RATE: 18 BRPM | HEART RATE: 81 BPM | SYSTOLIC BLOOD PRESSURE: 115 MMHG | BODY MASS INDEX: 27.94 KG/M2 | TEMPERATURE: 98.1 F | HEIGHT: 61 IN | WEIGHT: 148 LBS | OXYGEN SATURATION: 99 %

## 2019-12-13 DIAGNOSIS — Z00.00 ROUTINE GENERAL MEDICAL EXAMINATION AT A HEALTH CARE FACILITY: ICD-10-CM

## 2019-12-13 DIAGNOSIS — Z30.42 ENCOUNTER FOR SURVEILLANCE OF INJECTABLE CONTRACEPTIVE: ICD-10-CM

## 2019-12-13 DIAGNOSIS — Z30.42 DEPO-PROVERA CONTRACEPTIVE STATUS: ICD-10-CM

## 2019-12-13 DIAGNOSIS — Z30.40 ENCOUNTER FOR SURVEILLANCE OF CONTRACEPTIVES, UNSPECIFIED CONTRACEPTIVE: Primary | ICD-10-CM

## 2019-12-13 LAB
BACTERIA: NORMAL
CLUE CELLS: NORMAL
HBA1C MFR BLD: 5.8 % (ref 4.1–5.7)
HCG UR QL: NEGATIVE
HIV 1+2 AB+HIV1 P24 AG SERPL QL IA: NEGATIVE
MOTILE TRICHOMONAS: NEGATIVE
ODOR: NORMAL
PH WET PREP: >4.5 (ref 3.8–4.5)
WBC WET PREP: NORMAL (ref 2–5)
YEAST: NORMAL

## 2019-12-13 RX ORDER — MEDROXYPROGESTERONE ACETATE 150 MG/ML
150 INJECTION, SUSPENSION INTRAMUSCULAR
Status: ACTIVE | OUTPATIENT
Start: 2019-12-13

## 2019-12-13 RX ADMIN — MEDROXYPROGESTERONE ACETATE 150 MG: 150 INJECTION, SUSPENSION INTRAMUSCULAR at 17:27

## 2019-12-13 ASSESSMENT — MIFFLIN-ST. JEOR: SCORE: 1267.66

## 2019-12-13 NOTE — PROGRESS NOTES
Female Physical Note    Concerns today: No special concerns today.    -Patient shared that on 09/17/2019 she was found to have diverticulities. Per ED notes:  There is evidence on your CT scan of a condition called diverticulitis. This occurs when there is an infection and a small pouch of your colon. We treat this condition with antibiotics.  Please take the antibiotic Augmentin 875 mg 2 times daily with food. Please take this medication for 10 days. Patient reports that since then, she has been well  -History of seizure: stopped taking tegratol 3 months ago. Patient agrees to follow-up on 12/27/2019 about this    ROS:  CONSTITUTIONAL: no fatigue, no unexpected change in weight  SKIN: no worrisome rashes, no worrisome moles, no worrisome lesions  EYES: no acute vision problems or changes  ENT: no ear problems, no mouth problems, no throat problems  RESP: no significant cough, no shortness of breath  CV: no chest pain, no palpitations, no new or worsening peripheral edema  GI: no nausea, no vomiting, no constipation, no diarrhea    Sexually Active: No  Sexual concerns: No concerns; 3 weeks since last intercouarse  Contraception:Depo-Provera (her for renewal)  STD History: Neg  Last Pap Smear Date: 2015 normal  Abnormal Pap History: None    Patient Active Problem List   Diagnosis     Health Care Home     Conversion disorder     Contact dermatitis and other eczema, due to unspecified cause     Esophageal reflux     Insomnia     Other and unspecified derangement of medial meniscus     Pain in joint, shoulder region     Major depressive disorder, recurrent episode (H)     Tobacco use disorder     Knee cap dislocation     Acute reaction to stress     Depo-Provera contraceptive status       Current Outpatient Medications   Medication Sig Dispense Refill     Topiramate (TOPAMAX PO) Take 10 mg by mouth daily       medroxyPROGESTERone (DEPO-PROVERA) 150 MG/ML IM injection Inject 1 mL (150 mg) into the muscle every 3 months  for 21 days 1 mL 0       Past Medical History:   Diagnosis Date     Knee cap dislocation 11/17/2013     Unspecified acute reaction to stress 11/17/2013        Family History     Problem (# of Occurrences) Relation (Name,Age of Onset)    Asthma (1) Brother    Coronary Artery Disease (1) Mother    Heart Failure (1) Mother       Negative family history of: Cerebrovascular Disease          Problem List Medication List and Allergy List were reviewed.    Patient is an established patient of this clinic..    Social History     Tobacco Use     Smoking status: Current Every Day Smoker     Types: Cigarettes     Smokeless tobacco: Never Used     Tobacco comment: About 5 cig/day   Substance Use Topics     Alcohol use: Yes     Alcohol/week: 0.0 standard drinks     Comment: Occasionally.      Single  Children ? yes 2    Has anyone hurt you physically, for example by pushing, hitting, slapping or kicking you or forcing you to have sex? Denies  Do you feel threatened or controlled by a partner, ex-partner or anyone in your life? Denies    RISK BEHAVIORS AND HEALTHY HABITS:  Tobacco Use/Smoking: Details 25 + years, < 1/2 PPD  Illicit Drug Use: marijuana  Do you use alcohol? Yes - socially  Diet (5-7 servings of fruits/veg daily): stays away from dairy, varies   Exercise (30 min accumulated most days):working on it  Dental Care: Yes - June  Calcium 1500 mg/d:  No  Seat Belt Use: Yes     Pap/HPV cotest every 5 years for women 30-65   Recommended and patient accepted testing. and HIV screening:  Recommended and patient accepted testing.  Colon CA Screening (>50-75 ):  Testing not indicated , Breast CA Screening (>39 yo or 10 y before 1st degree relative diagnosis): Testing not indicated  and Lung CA Screening with low dose CT (55 - 80, with >= 30 ppy smoking history who are current smokers or quit within last 15y - annual low dose CT) Testing not indicated     Immunization History   Administered Date(s) Administered     HepB  "12/19/2006, 03/14/2007, 06/22/2010     Influenza (H1N1) 06/22/2010     Influenza (IIV3) PF 12/19/2006, 11/18/2010, 10/20/2011, 10/23/2012     Influenza Vaccine IM > 6 months Valent IIV4 10/25/2013, 10/19/2016, 09/14/2017, 12/13/2018     MMR 02/04/2015     Pneumococcal 23 valent 10/23/2012, 08/16/2013     TD (ADULT, 7+) 12/19/2006     Tdap (Adacel,Boostrix) 10/20/2011    Reviewed Immunization Record Today    EXAMINATION:   /80   Pulse 81   Temp 98.1  F (36.7  C) (Oral)   Resp 18   Ht 1.556 m (5' 1.25\")   Wt 67.1 kg (148 lb)   SpO2 99%   BMI 27.74 kg/m    GENERAL: healthy, alert and no distress  EYES: Eyes grossly normal to inspection, extraocular movements - intact, and PERRL  HENT: ear canals- normal; TMs- normal; Nose- normal; Mouth- no ulcers, no lesions  NECK: no tenderness, no adenopathy, no asymmetry, no masses, no stiffness; thyroid- normal to palpation  RESP: lungs clear to auscultation - no rales, no rhonchi, no wheezes  BREAST: no masses, no tenderness, no nipple discharge, no palpable axillary masses or adenopathy  CV: regular rates and rhythm, normal S1 S2, no S3 or S4 and no murmur, no click or rub -  ABDOMEN: soft, no tenderness, no  hepatosplenomegaly, no masses, normal bowel sounds  MS: extremities- no gross deformities noted, no edema  SKIN: no suspicious lesions, no rashes  NEURO: strength and tone- normal, sensory exam- grossly normal, mentation- intact, speech- normal, reflexes- symmetric  BACK: no CVA tenderness, no paralumbar tenderness  - female: cervix- normal, adnexae- normal; uterus- normal, no masses, no discharge  PSYCH: Alert and oriented times 3; speech- coherent , normal rate and volume; able to articulate logical thoughts, able to abstract reason, no tangential thoughts, no hallucinations or delusions, affect- normal  LYMPHATICS: ant. cervical- normal, post. cervical- normal, axillary- normal, supraclavicular- normal, inguinal- normal     GENITAL: exam done for wet prep " and PAP smear  Labia majora - lateral to the introitus (opening of vagina), covered with pubic hair. Labia minora- Medial to labia majora with no pubic hair covering.  Inspected for lesions, scars, old third degree perineal tears - none visible. SPECULUM EXAMINATION: The labia  with the index finger and thumb of left hand. NO  tenderness on insertion of the speculum. No active bleeding or blood in the vaginal vault. The cervix is then inspected and normal. Pink, no active discharge, no ulcerations. Bimanual palpation of the pelvis: no adnexal tenderness, no masses palpated.        ASSESSMENT:  1. Health Care Maintenance: Normal Physical Exam    PLAN:  -History of seizure: stopped taking tegratol 3 months ago. Patient agrees to follow-up on 12/27/2019 about this  -Encouraged tobacco cessation  -HIV negative; chlamydia and gonorrhea negative;   -HPV negative, awaiting PAP smear results  -Depo shot given - service renewed    12/13/2019  Plan Documentation  Service ordered Depo Provera injection (150mg IM) may be given every 3 months for one year per protoccol.  Plan and order should be renewed one year from 12/13/2019 .  Ordered by LASHAWN RIVERA MD MPH (PGY2)      Options for treatment and follow-up care were reviewed with the patient and/or guardian. Pt and/or guardian engaged in the decision making process and verbalized understanding of the options discussed and agreed with the final plan.    Precepted today with: MD Lashawn Sanders MD, MPH (PGY 2)  [facility] Family Medicine Resident  Pager: [phone]

## 2019-12-16 LAB
C TRACH RRNA CVX QL NAA+PROBE: NEGATIVE
FINAL DIAGNOSIS: NORMAL
HPV 16 DNA: NEGATIVE
HPV 18 DNA: NEGATIVE
HPV SOURCE: NORMAL
N GONORRHOEA RRNA SPEC QL NAA+PROBE: NEGATIVE
OTHER HR HPV: NEGATIVE
SPECIMEN DESCRIPTION: NORMAL

## 2019-12-16 NOTE — NURSING NOTE
12.13.2019  Clinic Administered Medication Documentation    MEDICATION LIST:   Depo Provera Documentation    Prior to injection, verified patient identity using patient's name and date of birth. Medication was administered. Please see MAR and medication order for additional information. Patient instructed to N/A.    BP: 115/80    LAST PAP/EXAM: No results found for: PAP  URINE HCG:Negative      Was entire vial of medication used? Yes  Vial/Syringe: Single dose vial  Expiration Date:  12/2020      Name of provider who requested the medication administration: Dr. Rosenthal  Name of provider on site (faculty or community preceptor) at the time of performing the medication administration: Dr. Augustine    Date of next administration: 2/28/20 and 3/21/20  Date of next office visit with provider to renew medication plan (must be seen annually):  12/13/2020

## 2019-12-20 NOTE — PROGRESS NOTES
Preceptor Attestation:  Patient's case reviewed and discussed with  Patient seen and discussed with the resident..  I agree with written assessment and plan of care.  Supervising Physician:  Miranda Augustine MD  [facility]

## 2019-12-23 ENCOUNTER — RECORDS - HEALTHEAST (OUTPATIENT)
Dept: ADMINISTRATIVE | Facility: OTHER | Age: 44
End: 2019-12-23

## 2019-12-23 LAB
CYTOLOGY CVX/VAG DOC THIN PREP: NORMAL
HIGH RISK?: NO
HPV REFLEX?: NORMAL
LAB AP ABNORMAL BLEEDING: YES
LAB AP BIRTH CONTROL/HORMONES: NORMAL
LAB AP CERVICAL APPEARANCE: NORMAL
LAB AP GYN OTHER FINDINGS: NORMAL
LAB AP PATIENT STATUS: NO
LAB AP PREVIOUS ABNL: NORMAL
LAB AP PREVIOUS NORMAL: 2015
LAST MENS PERIOD: NORMAL
PATH REPORT.COMMENTS IMP SPEC: NORMAL
PATH REPORT.COMMENTS IMP SPEC: NORMAL
SPECIMEN ADEQUACY:: NORMAL

## 2020-01-22 ENCOUNTER — OFFICE VISIT (OUTPATIENT)
Dept: FAMILY MEDICINE | Facility: CLINIC | Age: 45
End: 2020-01-22
Payer: COMMERCIAL

## 2020-01-22 VITALS
DIASTOLIC BLOOD PRESSURE: 86 MMHG | HEIGHT: 61 IN | OXYGEN SATURATION: 100 % | RESPIRATION RATE: 20 BRPM | BODY MASS INDEX: 28.55 KG/M2 | TEMPERATURE: 97.9 F | HEART RATE: 87 BPM | SYSTOLIC BLOOD PRESSURE: 124 MMHG | WEIGHT: 151.2 LBS

## 2020-01-22 DIAGNOSIS — Z23 NEED FOR PROPHYLACTIC VACCINATION AND INOCULATION AGAINST INFLUENZA: ICD-10-CM

## 2020-01-22 DIAGNOSIS — R05.9 COUGH: ICD-10-CM

## 2020-01-22 DIAGNOSIS — J11.1 INFLUENZA-LIKE ILLNESS: Primary | ICD-10-CM

## 2020-01-22 LAB
FLUAV AG UPPER RESP QL IA.RAPID: NEGATIVE
FLUBV AG UPPER RESP QL IA.RAPID: NEGATIVE

## 2020-01-22 RX ORDER — OSELTAMIVIR PHOSPHATE 75 MG/1
75 CAPSULE ORAL 2 TIMES DAILY
Qty: 10 CAPSULE | Refills: 0 | Status: SHIPPED | OUTPATIENT
Start: 2020-01-22 | End: 2020-01-27

## 2020-01-22 ASSESSMENT — MIFFLIN-ST. JEOR: SCORE: 1282.18

## 2020-01-22 NOTE — PROGRESS NOTES
"  Subjective:   Palmira Veloz is a 44 year old year old female presenting with a chief complaint of Upper Respiratory/ENT symptoms:    Symptoms include: chills, stuffy nose, cough - productive, headache, body aches, fatigue and nausea  Onset how long ago? 2 days  Course of illness is: worsening, more short of breath  Severity:  moderate  Treatment measures tried:  OTC Cough med, Fluids and Rest.  Predisposing factors include ill contact: Family member  and tobacco use.    PMHX:     Patient Active Problem List   Diagnosis     Health Care Home     Conversion disorder     Contact dermatitis and other eczema, due to unspecified cause     Esophageal reflux     Insomnia     Other and unspecified derangement of medial meniscus     Pain in joint, shoulder region     Major depressive disorder, recurrent episode (H)     Tobacco use disorder     Knee cap dislocation     Acute reaction to stress     Depo-Provera contraceptive status     Current Outpatient Medications   Medication Sig Dispense Refill     oseltamivir (TAMIFLU) 75 MG capsule Take 1 capsule (75 mg) by mouth 2 times daily for 5 days 10 capsule 0     medroxyPROGESTERone (DEPO-PROVERA) 150 MG/ML IM injection Inject 1 mL (150 mg) into the muscle every 3 months for 21 days 1 mL 0     Topiramate (TOPAMAX PO) Take 10 mg by mouth daily         Allergies   Allergen Reactions     Nkda [No Known Drug Allergies]      Review of Systems:     GENERAL:  see above  SKIN:  no rash, hives, other lesions.  EYE:  no pain, discharge, redness, itching.  ENT:  as above  RESP:  no wheeze or respiratory distress.  CV:  no tachycardia, palpitations, syncope.  GI:  no nausea, vomiting, diarrhea, constipation, abdominal pain.  MUSCULOSKELETAL:  no myalgia or arthralgia.     Objective:     /86   Pulse 87   Temp 97.9  F (36.6  C) (Oral)   Resp 20   Ht 1.556 m (5' 1.25\")   Wt 68.6 kg (151 lb 3.2 oz)   SpO2 100%   BMI 28.34 kg/m    Body mass index is 28.34 kg/m .  GENERAL: No acute " distress.  HEENT: Mild injection of conjunctiva.  Clear nasal discharge.  Oropharynx moist and clear.   NECK: Small, mobile, scattered bilateral lymphadenopathy.   CHEST:  clear, no wheezing or rales. Normal symmetric air entry throughout both lung fields. No chest wall deformities or tenderness.  HEART:  S1 and S2 normal, no murmurs, clicks, gallops or rubs. Regular rate and rhythm.  SKIN:  Only benign skin findings. No unusual rashes or suspicious skin lesions noted. Nails appear normal.      Diagnostic Test Results:  Results for orders placed or performed in visit on 01/22/20 (from the past 24 hour(s))   Influenza A/B Antigen ([facility])   Result Value Ref Range    Influenza A Negative Negative    Influenza B Negative Negative       Assessment & Plan:     1. Influenza-like illness  2. Cough  2 days of acute onset of cough, chills, and body aches concerning for influenza or influenza-like illness. Rapid flu negative. Given severity of patient's symptoms and limitations of the test, will treat patient empirically with 5 days of Tamiflu. Reviewed symptomatic cares and return precautions. Work note provided.   - oseltamivir (TAMIFLU) 75 MG capsule; Take 1 capsule (75 mg) by mouth 2 times daily for 5 days  Dispense: 10 capsule; Refill: 0  - Influenza A/B Antigen ([facility])    3. Need for prophylactic vaccination and inoculation against influenza  - Fluzone quad, preserve-free/prefilled  0.5ml, 6+ months [[id number]]    All of the above was discussed with the patient and his family who are in agreement with plan as outlined above.  Red flags that should prompt re-evaluation discussed and understood.    Cammy Woodruff MD  [facility] Medicine Resident    Precepted with: Doug Bird MD

## 2020-01-22 NOTE — LETTER
RETURN TO WORK/SCHOOL FORM    1/22/2020    Re: Palmira Veloz  1975      To Whom It May Concern:     Palmira Veloz was seen in clinic today.  She may return to work without restrictions on 1/27/20                Cammy Woodruff MD  1/22/2020 2:56 PM

## 2020-01-22 NOTE — PROGRESS NOTES
Preceptor Attestation:   Patient seen, evaluated and discussed with the resident. I have verified the content of the note, which accurately reflects my assessment of the patient and the plan of care.    Supervising Physician:Doug Bird MD    [facility]

## 2020-03-02 ENCOUNTER — TELEPHONE (OUTPATIENT)
Dept: FAMILY MEDICINE | Facility: CLINIC | Age: 45
End: 2020-03-02

## 2020-03-02 NOTE — TELEPHONE ENCOUNTER
Incoming call from pt, who said she was still experiencing pain, but medication was helping. One stitch came out over the weekend. Wanted to schedule on 3/10. EDF scheduled for 3/10 @ 8:00 AM w/ Dr. Blum.

## 2020-03-10 ENCOUNTER — ALLIED HEALTH/NURSE VISIT (OUTPATIENT)
Dept: FAMILY MEDICINE | Facility: CLINIC | Age: 45
End: 2020-03-10
Payer: COMMERCIAL

## 2020-03-10 VITALS
RESPIRATION RATE: 22 BRPM | HEIGHT: 62 IN | BODY MASS INDEX: 27.75 KG/M2 | TEMPERATURE: 98.8 F | OXYGEN SATURATION: 100 % | HEART RATE: 95 BPM | DIASTOLIC BLOOD PRESSURE: 83 MMHG | SYSTOLIC BLOOD PRESSURE: 125 MMHG | WEIGHT: 150.8 LBS

## 2020-03-10 DIAGNOSIS — Z30.42 ENCOUNTER FOR SURVEILLANCE OF INJECTABLE CONTRACEPTIVE: Primary | ICD-10-CM

## 2020-03-10 RX ADMIN — MEDROXYPROGESTERONE ACETATE 150 MG: 150 INJECTION, SUSPENSION INTRAMUSCULAR at 14:17

## 2020-03-10 ASSESSMENT — MIFFLIN-ST. JEOR: SCORE: 1292.27

## 2020-03-10 NOTE — NURSING NOTE
Clinic Administered Medication Documentation      Depo Provera Documentation    URINE HCG: not indicated    Depo-Provera Standing Order inclusion/exclusion criteria reviewed.   Patient meets: inclusion criteria     BP: 125/83  LAST PAP/EXAM: No results found for: PAP    Prior to injection, verified patient identity using patient's name and date of birth. Medication was administered. Please see MAR and medication order for additional information.     Was entire vial of medication used? Yes  Vial/Syringe: Single dose vial  Expiration Date:  02/21    Patient instructed to report any adverse reaction to staff immediately .  NEXT INJECTION DUE: 5/26/20 - 6/16/20      Name of provider who requested the medication administration: Dr. Hayward   Name of provider on site (faculty or community preceptor) at the time of performing the medication administration: Dr. Hayward    Date of next administration: 5/26/20 - 6/16/60   Date of next office visit with provider to renew medication plan (must be seen annually): 12/13/19

## 2020-06-17 ENCOUNTER — TRANSFERRED RECORDS (OUTPATIENT)
Dept: HEALTH INFORMATION MANAGEMENT | Facility: CLINIC | Age: 45
End: 2020-06-17

## 2020-06-26 ENCOUNTER — TELEPHONE (OUTPATIENT)
Dept: FAMILY MEDICINE | Facility: CLINIC | Age: 45
End: 2020-06-26

## 2020-07-28 ENCOUNTER — OFFICE VISIT (OUTPATIENT)
Dept: FAMILY MEDICINE | Facility: CLINIC | Age: 45
End: 2020-07-28
Payer: COMMERCIAL

## 2020-07-28 VITALS
WEIGHT: 148 LBS | OXYGEN SATURATION: 100 % | RESPIRATION RATE: 20 BRPM | DIASTOLIC BLOOD PRESSURE: 70 MMHG | SYSTOLIC BLOOD PRESSURE: 107 MMHG | HEART RATE: 71 BPM | HEIGHT: 62 IN | BODY MASS INDEX: 27.23 KG/M2 | TEMPERATURE: 98.4 F

## 2020-07-28 DIAGNOSIS — Z30.42 DEPO-PROVERA CONTRACEPTIVE STATUS: Primary | ICD-10-CM

## 2020-07-28 LAB — HCG UR QL: NEGATIVE

## 2020-07-28 RX ADMIN — MEDROXYPROGESTERONE ACETATE 150 MG: 150 INJECTION, SUSPENSION INTRAMUSCULAR at 12:07

## 2020-07-28 ASSESSMENT — MIFFLIN-ST. JEOR: SCORE: 1268.44

## 2020-07-28 NOTE — PROGRESS NOTES
HPI:       Palmira Veloz is a 45 year old  Female resents for follow up of concern(s) listed below contraceptive management.       Chief Complaint   Patient presents with     Contraception     Late for depo injection. Last sexual activity 3 days ago, per pt with condom     Medication Reconciliation     completed.        -She is sexually active with 1 male  partner.   -Methods used in the past: only Depo for the past 20 years.   -Admits to tobacco use: pack can last 3 days; not ready to quit; willing to discuss it at the next visit  -LNMP unknown  -Last PAP smear: 12/13/2019 (next one: 12/2024)    -Denies vaginal symptoms  -Denies urinary symptoms  -Last sexual encounter was 3 days ago; she insist she can't be pregnant.   -Difficult for her to come in often, would rather have the Depo shot today.    Medication Reconciliation completed             Review of Systems:     C: NEGATIVE for fatigue, unexpected change in weight  E: NEGATIVE for acute vision problems or changes  R: NEGATIVE for significant cough or shortness of breath  CV: NEGATIVE for chest pain, palpitations or new or worsening peripheral edema  P: NEGATIVE for changes in mood or affect            PMHX:     Patient Active Problem List   Diagnosis     Health Care Home     Conversion disorder     Contact dermatitis and other eczema, due to unspecified cause     Esophageal reflux     Insomnia     Other and unspecified derangement of medial meniscus     Pain in joint, shoulder region     Major depressive disorder, recurrent episode (H)     Tobacco use disorder     Knee cap dislocation     Acute reaction to stress     Depo-Provera contraceptive status       Current Outpatient Medications   Medication Sig Dispense Refill     Topiramate (TOPAMAX PO) Take 10 mg by mouth daily                Allergies   Allergen Reactions     Nkda [No Known Drug Allergies]        No results found for this or any previous visit (from the past 24 hour(s)).             "Physical Exam:     Vitals:    07/28/20 1116   BP: 107/70   Pulse: 71   Resp: 20   Temp: 98.4  F (36.9  C)   TempSrc: Oral   SpO2: 100%   Weight: 67.1 kg (148 lb)   Height: 1.565 m (5' 1.61\")     Body mass index is 27.41 kg/m .    GENERAL APPEARANCE: healthy, alert and no distress,  RESP: lungs clear to auscultation - no rales, rhonchi or wheezes  CV: regular rate and rhythm,  and no murmur, click,  rub or gallop  PSYCH: mood and affect normal/bright      Assessment and Plan     (Z30.42) Depo-Provera contraceptive status  (primary encounter diagnosis)  Comment: UPT negative. Considering the patient had not abstained from intercourse for the past 14 days and since it's hard for her to come into clinic, we feel it's reasonable to give the Depo shot today. Patient reports she will do home pregnancy test and call with results. She has signed up for [employer] at: [email or web address]). Potential side effects and correct usage of medication was discussed and allowed for questions.     Plan:  -Palmira will return for reevaluation with any questions or problems  -Next annual physical in 10/2020 with Depo shot  -Encouraged annual preventative exam including pelvic and pap exam.   -She was asked to use a back up method for the first one to two months of initiation of contraception.    -Also encouraged use of condomes for HIV/STD transimission prevention.    07/28/2020  Plan Documentation   Service ordered Depo Provera injection (150mg IM) may be given every 3 months for one year per protoccol.  Plan and order should be renewed one year from 07/28/2020 .  Ordered by LASHAWN RIVERA MD MPH (PGY3)    Options for treatment and follow-up care were reviewed with the patient and/or guardian. Pt and/or guardian engaged in the decision making process and verbalized understanding of the options discussed and agreed with the final plan.    Precepted today with: MD Lashawn Grace MD, MPH (PGY 3)  [facility] [facility] Resident  Pager: [phone]

## 2020-07-28 NOTE — PROGRESS NOTES
Preceptor Attestation:   Patient seen, evaluated and discussed with the resident. I have verified the content of the note, which accurately reflects my assessment of the patient and the plan of care.  Supervising Physician:Jennifer Ramirez MD  [facility]

## 2020-07-28 NOTE — PATIENT INSTRUCTIONS
DEPO shot given today:  07/28/2020  Plan Documentation  Service ordered Depo Provera injection (150mg IM) may be given every 3 months for one year per protoccol.  Plan and order should be renewed one year from 07/28/2020 .  Ordered by LASHAWN RIVERA MD MPH (PGY3)

## 2020-07-28 NOTE — NURSING NOTE
"Chief Complaint   Patient presents with     Contraception     Late for depo injection. Last sexual activity 3 days ago, per pt with condom     Medication Reconciliation     completed.        /70   Pulse 71   Temp 98.4  F (36.9  C) (Oral)   Resp 20   Ht 1.565 m (5' 1.61\")   Wt 67.1 kg (148 lb)   SpO2 100%   BMI 27.41 kg/m        ~ Dakota Baumann CMA ([place])  [facility]  Phone: [phone]    Clinic Administered Medication Documentation      Depo Provera Documentation    URINE HCG: negative    Depo-Provera Standing Order inclusion/exclusion criteria reviewed.   Patient meets: inclusion criteria     BP: 107/70  LAST PAP/EXAM: No results found for: PAP    Prior to injection, verified patient identity using patient's name and date of birth. Medication was administered. Please see MAR and medication order for additional information.     Was entire vial of medication used? Yes  Vial/Syringe: Single dose vial  Expiration Date:  1/2021    Patient instructed to NA.  NEXT INJECTION DUE: 10/13/20 - 10/27/20 + Physical exam per Dr. Rosenthal      Name of provider who requested the medication administration: Dr. Rosenthal  Name of provider on site (faculty or community preceptor) at the time of performing the medication administration: Dr. Ramirez            "

## 2020-08-11 ENCOUNTER — TELEPHONE (OUTPATIENT)
Dept: FAMILY MEDICINE | Facility: CLINIC | Age: 45
End: 2020-08-11

## 2020-08-11 NOTE — TELEPHONE ENCOUNTER
[facility] Family Medicine phone call message- general phone call:    Reason for call: FYI: Patient would like to inform PCP that she took her home pregnancy test and it came back negative.    Return call needed: Yes    OK to leave a message on voice mail? Yes    Primary language: English      needed? No    Call taken on August 11, 2020 at 9:50 AM by Florin Baumann

## 2020-08-12 NOTE — TELEPHONE ENCOUNTER
Saw Ms. Palmira Veloz on 07/28/2020 for (Z30.42) Depo-Provera contraceptive status  (primary encounter diagnosis)    Considering the patient had not abstained from intercourse for the past 14 days before her 07/28/2020 appt and since it was hard for her to come into clinic, she received a Depo shot on 07/28/2020. She called clinic and reported NEGATIVE home pregnancy test.    Plan:  -Routed message to colored team to schedule annual physical + Depo shot appointment for the patient in 10/2020    Mellisa Rosenthal MD, MPH (PGY 3)  [facility] Family Medicine Resident  Pager: [phone]

## 2020-08-19 ENCOUNTER — TELEPHONE (OUTPATIENT)
Dept: FAMILY MEDICINE | Facility: CLINIC | Age: 45
End: 2020-08-19

## 2020-08-19 NOTE — TELEPHONE ENCOUNTER
I got the pt ED discharge paperwork, I called to check up on the pt,and help her schedule a ED follow up. The pt stated that she is doing fine, but her ankle still swollen. Pt did not feel that she needs a follow up. I told the pt to give us a call if she changes her mind.